# Patient Record
Sex: MALE | Race: OTHER | Employment: FULL TIME | URBAN - METROPOLITAN AREA
[De-identification: names, ages, dates, MRNs, and addresses within clinical notes are randomized per-mention and may not be internally consistent; named-entity substitution may affect disease eponyms.]

---

## 2017-07-31 ENCOUNTER — HOSPITAL ENCOUNTER (INPATIENT)
Age: 47
LOS: 2 days | Discharge: HOME | DRG: 310 | End: 2017-08-02
Attending: EMERGENCY MEDICINE | Admitting: INTERNAL MEDICINE
Payer: COMMERCIAL

## 2017-07-31 ENCOUNTER — APPOINTMENT (OUTPATIENT)
Dept: GENERAL RADIOLOGY | Age: 47
DRG: 310 | End: 2017-07-31
Attending: EMERGENCY MEDICINE
Payer: COMMERCIAL

## 2017-07-31 DIAGNOSIS — I48.92 ATRIAL FLUTTER WITH RAPID VENTRICULAR RESPONSE (HCC): Primary | ICD-10-CM

## 2017-07-31 PROBLEM — I48.91 ATRIAL FIBRILLATION (HCC): Status: ACTIVE | Noted: 2017-07-31

## 2017-07-31 LAB
ALBUMIN SERPL BCP-MCNC: 3.3 G/DL (ref 3.5–5)
ALBUMIN/GLOB SERPL: 1 {RATIO} (ref 1.1–2.2)
ALP SERPL-CCNC: 62 U/L (ref 45–117)
ALT SERPL-CCNC: 121 U/L (ref 12–78)
ANION GAP BLD CALC-SCNC: 9 MMOL/L (ref 5–15)
AST SERPL W P-5'-P-CCNC: 57 U/L (ref 15–37)
BASOPHILS # BLD AUTO: 0 K/UL (ref 0–0.1)
BASOPHILS # BLD: 1 % (ref 0–1)
BILIRUB SERPL-MCNC: 1 MG/DL (ref 0.2–1)
BUN SERPL-MCNC: 12 MG/DL (ref 6–20)
BUN/CREAT SERPL: 11 (ref 12–20)
CALCIUM SERPL-MCNC: 8.3 MG/DL (ref 8.5–10.1)
CHLORIDE SERPL-SCNC: 106 MMOL/L (ref 97–108)
CO2 SERPL-SCNC: 23 MMOL/L (ref 21–32)
CREAT SERPL-MCNC: 1.05 MG/DL (ref 0.7–1.3)
EOSINOPHIL # BLD: 0.2 K/UL (ref 0–0.4)
EOSINOPHIL NFR BLD: 3 % (ref 0–7)
ERYTHROCYTE [DISTWIDTH] IN BLOOD BY AUTOMATED COUNT: 15.1 % (ref 11.5–14.5)
GLOBULIN SER CALC-MCNC: 3.2 G/DL (ref 2–4)
GLUCOSE SERPL-MCNC: 102 MG/DL (ref 65–100)
HCT VFR BLD AUTO: 42.7 % (ref 36.6–50.3)
HGB BLD-MCNC: 14.6 G/DL (ref 12.1–17)
LYMPHOCYTES # BLD AUTO: 21 % (ref 12–49)
LYMPHOCYTES # BLD: 1.3 K/UL (ref 0.8–3.5)
MAGNESIUM SERPL-MCNC: 1.8 MG/DL (ref 1.6–2.4)
MCH RBC QN AUTO: 34.3 PG (ref 26–34)
MCHC RBC AUTO-ENTMCNC: 34.2 G/DL (ref 30–36.5)
MCV RBC AUTO: 100.2 FL (ref 80–99)
MONOCYTES # BLD: 0.7 K/UL (ref 0–1)
MONOCYTES NFR BLD AUTO: 12 % (ref 5–13)
NEUTS SEG # BLD: 3.9 K/UL (ref 1.8–8)
NEUTS SEG NFR BLD AUTO: 63 % (ref 32–75)
PLATELET # BLD AUTO: 218 K/UL (ref 150–400)
POTASSIUM SERPL-SCNC: 4 MMOL/L (ref 3.5–5.1)
PROT SERPL-MCNC: 6.5 G/DL (ref 6.4–8.2)
RBC # BLD AUTO: 4.26 M/UL (ref 4.1–5.7)
SODIUM SERPL-SCNC: 138 MMOL/L (ref 136–145)
TROPONIN I SERPL-MCNC: 0.09 NG/ML
WBC # BLD AUTO: 6.1 K/UL (ref 4.1–11.1)

## 2017-07-31 PROCEDURE — 93306 TTE W/DOPPLER COMPLETE: CPT

## 2017-07-31 PROCEDURE — 93005 ELECTROCARDIOGRAM TRACING: CPT

## 2017-07-31 PROCEDURE — 85025 COMPLETE CBC W/AUTO DIFF WBC: CPT | Performed by: EMERGENCY MEDICINE

## 2017-07-31 PROCEDURE — 71010 XR CHEST PORT: CPT

## 2017-07-31 PROCEDURE — 74011000258 HC RX REV CODE- 258: Performed by: EMERGENCY MEDICINE

## 2017-07-31 PROCEDURE — 84484 ASSAY OF TROPONIN QUANT: CPT | Performed by: EMERGENCY MEDICINE

## 2017-07-31 PROCEDURE — 74011250636 HC RX REV CODE- 250/636: Performed by: EMERGENCY MEDICINE

## 2017-07-31 PROCEDURE — 36415 COLL VENOUS BLD VENIPUNCTURE: CPT | Performed by: EMERGENCY MEDICINE

## 2017-07-31 PROCEDURE — 65660000000 HC RM CCU STEPDOWN

## 2017-07-31 PROCEDURE — 96365 THER/PROPH/DIAG IV INF INIT: CPT

## 2017-07-31 PROCEDURE — 80053 COMPREHEN METABOLIC PANEL: CPT | Performed by: EMERGENCY MEDICINE

## 2017-07-31 PROCEDURE — 83735 ASSAY OF MAGNESIUM: CPT | Performed by: EMERGENCY MEDICINE

## 2017-07-31 PROCEDURE — 99284 EMERGENCY DEPT VISIT MOD MDM: CPT

## 2017-07-31 PROCEDURE — 96376 TX/PRO/DX INJ SAME DRUG ADON: CPT

## 2017-07-31 PROCEDURE — 74011000250 HC RX REV CODE- 250: Performed by: EMERGENCY MEDICINE

## 2017-07-31 RX ORDER — DILTIAZEM HYDROCHLORIDE 5 MG/ML
10 INJECTION INTRAVENOUS
Status: COMPLETED | OUTPATIENT
Start: 2017-07-31 | End: 2017-07-31

## 2017-07-31 RX ORDER — WARFARIN 1 MG/1
3 TABLET ORAL
COMMUNITY
End: 2017-08-09 | Stop reason: DRUGHIGH

## 2017-07-31 RX ORDER — SODIUM CHLORIDE 0.9 % (FLUSH) 0.9 %
5-10 SYRINGE (ML) INJECTION AS NEEDED
Status: DISCONTINUED | OUTPATIENT
Start: 2017-07-31 | End: 2017-08-02 | Stop reason: HOSPADM

## 2017-07-31 RX ORDER — SODIUM CHLORIDE 0.9 % (FLUSH) 0.9 %
5-10 SYRINGE (ML) INJECTION EVERY 8 HOURS
Status: DISCONTINUED | OUTPATIENT
Start: 2017-07-31 | End: 2017-08-02 | Stop reason: HOSPADM

## 2017-07-31 RX ADMIN — DILTIAZEM HYDROCHLORIDE 10 MG/HR: 5 INJECTION INTRAVENOUS at 17:08

## 2017-07-31 RX ADMIN — DILTIAZEM HYDROCHLORIDE 10 MG: 5 INJECTION INTRAVENOUS at 17:04

## 2017-07-31 RX ADMIN — Medication 10 ML: at 21:57

## 2017-07-31 RX ADMIN — SODIUM CHLORIDE 1000 ML: 900 INJECTION, SOLUTION INTRAVENOUS at 17:03

## 2017-07-31 RX ADMIN — Medication 10 ML: at 17:08

## 2017-07-31 NOTE — ROUTINE PROCESS
TRANSFER - OUT REPORT:    Verbal report given to Radames Franks RN(name) on Mahad Thomas  being transferred to [facility](unit) for routine progression of care       Report consisted of patients Situation, Background, Assessment and   Recommendations(SBAR). Information from the following report(s) SBAR, ED Summary and MAR was reviewed with the receiving nurse. Lines:   Peripheral IV 07/31/17 Left Forearm (Active)   Site Assessment Clean, dry, & intact 7/31/2017  5:33 PM   Phlebitis Assessment 0 7/31/2017  5:33 PM   Infiltration Assessment 0 7/31/2017  5:33 PM   Dressing Status Clean, dry, & intact 7/31/2017  5:33 PM   Dressing Type Transparent 7/31/2017  5:33 PM   Hub Color/Line Status Pink 7/31/2017  5:33 PM        Opportunity for questions and clarification was provided.       Patient transported with:   Registered Nurse

## 2017-07-31 NOTE — IP AVS SNAPSHOT
[address] 
[phone] Patient: Dougie Carter MRN: [id number] FQN:[id number] You are allergic to the following No active allergies Recent Documentation Height Weight BMI Smoking Status 1.727 m 75.5 kg 25.31 kg/m2 Never Smoker Emergency Contacts Name Discharge Info Relation Home Work Mobile RenatoleloIsac DISCHARGE CAREGIVER [3] Friend [5]   [phone] About your hospitalization You were admitted on:  July 31, 2017 You last received care in the:  [facility] 3 PROG CARE TELE 2 You were discharged on:  August 2, 2017 Unit phone number:  [phone] Why you were hospitalized Your primary diagnosis was:  Not on File Your diagnoses also included:  Atrial Fibrillation (Hcc) Providers Seen During Your Hospitalizations Provider Role Specialty Primary office phone Anaid Robin MD Attending Provider Emergency Medicine [phone] Meghna Ontiveros MD Attending Provider Cardiology [phone] Your Primary Care Physician (PCP) Primary Care Physician Office Phone Office Fax NONE ** None ** ** None ** Follow-up Information Follow up With Details Comments Contact Info None   None ([phone]) Patient stated that they have no PCP Meghna Ontiveros MD On 8/9/2017 1:20 pm  [id number] [address] [facility] 600 [address] [facility] 
[phone] Your Appointments Wednesday August 09, 2017  1:20 PM EDT HOSPITAL DISCHARGE with Meghna Ontiveros MD  
[facility] ([address]) [address] [place] [address] [phone] [facility]  
[phone] Current Discharge Medication List  
  
CONTINUE these medications which have NOT CHANGED Dose & Instructions Dispensing Information Comments Morning Noon Evening Bedtime * CARVEDILOL PO Your last dose was: Your next dose is:    
   
   
 Dose:  10 mg Take 10 mg by mouth daily (after breakfast). Translated from medication list in [place] (medication name Artist) Refills:  0  
     
   
   
   
  
 * CARVEDILOL PO Your last dose was: Your next dose is:    
   
   
 Dose:  5 mg Take 5 mg by mouth daily (after dinner). Translated from medication list in [facility] (medication name Artist) Refills:  0  
     
   
   
   
  
 enalapril 2.5 mg tablet Commonly known as:  Willia Rosendo Your last dose was: Your next dose is:    
   
   
 Dose:  2.5 mg Take 2.5 mg by mouth daily. Refills:  0 FEBUXOSTAT PO Your last dose was: Your next dose is:    
   
   
 Dose:  50 mg Take 50 mg by mouth daily (after breakfast). Translated from medication list in [facility] (medication name Kimberley Khan) Refills:  0  
     
   
   
   
  
 * warfarin 1 mg tablet Commonly known as:  COUMADIN Your last dose was: Your next dose is:    
   
   
 Dose:  3 mg Take 3 mg by mouth daily (with breakfast). Total dose 3.75mg daily every morning per patient Refills:  0  
     
   
   
   
  
 * warfarin Tab Commonly known as:  COUMADIN Your last dose was: Your next dose is:    
   
   
 Dose:  0.75 mg Take 0.75 mg by mouth daily (with breakfast). Total dose 3.75 mg every morning per patient Refills:  0  
     
   
   
   
  
 * Notice: This list has 4 medication(s) that are the same as other medications prescribed for you. Read the directions carefully, and ask your doctor or other care provider to review them with you. Discharge Instructions Increase carvedilol to 10 mg twice a day Atrial Fibrillation: Care Instructions Your Care Instructions Atrial fibrillation is an irregular and often fast heartbeat. Treating this condition is important for several reasons.  It can cause blood clots, which can travel from your heart to your brain and cause a stroke. If you have a fast heartbeat, you may feel lightheaded, dizzy, and weak. An irregular heartbeat can also increase your risk for heart failure. Atrial fibrillation is often the result of another heart condition, such as high blood pressure or coronary artery disease. Making changes to improve your heart condition will help you stay healthy and active. Follow-up care is a key part of your treatment and safety. Be sure to make and go to all appointments, and call your doctor if you are having problems. It's also a good idea to know your test results and keep a list of the medicines you take. How can you care for yourself at home? Medicines · Take your medicines exactly as prescribed. Call your doctor if you think you are having a problem with your medicine. You will get more details on the specific medicines your doctor prescribes. · If your doctor has given you a blood thinner to prevent a stroke, be sure you get instructions about how to take your medicine safely. Blood thinners can cause serious bleeding problems. · Do not take any vitamins, over-the-counter drugs, or herbal products without talking to your doctor first. 
Lifestyle changes · Do not smoke. Smoking can increase your chance of a stroke and heart attack. If you need help quitting, talk to your doctor about stop-smoking programs and medicines. These can increase your chances of quitting for good. · Eat a heart-healthy diet. · Stay at a healthy weight. Lose weight if you need to. · Limit alcohol to 2 drinks a day for men and 1 drink a day for women. Too much alcohol can cause health problems. · Avoid colds and flu. Get a pneumococcal vaccine shot. If you have had one before, ask your doctor whether you need another dose. Get a flu shot every year. If you must be around people with colds or flu, wash your hands often. Activity · If your doctor recommends it, get more exercise. Walking is a good choice. Bit by bit, increase the amount you walk every day. Try for at least 30 minutes on most days of the week. You also may want to swim, bike, or do other activities. Your doctor may suggest that you join a cardiac rehabilitation program so that you can have help increasing your physical activity safely. · Start light exercise if your doctor says it is okay. Even a small amount will help you get stronger, have more energy, and manage stress. Walking is an easy way to get exercise. Start out by walking a little more than you did in the hospital. Gradually increase the amount you walk. · When you exercise, watch for signs that your heart is working too hard. You are pushing too hard if you cannot talk while you are exercising. If you become short of breath or dizzy or have chest pain, sit down and rest immediately. · Check your pulse regularly. Place two fingers on the artery at the palm side of your wrist, in line with your thumb. If your heartbeat seems uneven or fast, talk to your doctor. When should you call for help? Call 911 anytime you think you may need emergency care. For example, call if: 
· You have symptoms of a heart attack. These may include: ¨ Chest pain or pressure, or a strange feeling in the chest. 
¨ Sweating. ¨ Shortness of breath. ¨ Nausea or vomiting. ¨ Pain, pressure, or a strange feeling in the back, neck, jaw, or upper belly or in one or both shoulders or arms. ¨ Lightheadedness or sudden weakness. ¨ A fast or irregular heartbeat. After you call 911, the  may tell you to chew 1 adult-strength or 2 to 4 low-dose aspirin. Wait for an ambulance. Do not try to drive yourself. · You have symptoms of a stroke. These may include: 
¨ Sudden numbness, tingling, weakness, or loss of movement in your face, arm, or leg, especially on only one side of your body. ¨ Sudden vision changes. ¨ Sudden trouble speaking. ¨ Sudden confusion or trouble understanding simple statements. ¨ Sudden problems with walking or balance. ¨ A sudden, severe headache that is different from past headaches. · You passed out (lost consciousness). Call your doctor now or seek immediate medical care if: 
· You have new or increased shortness of breath. · You feel dizzy or lightheaded, or you feel like you may faint. · Your heart rate becomes irregular. · You can feel your heart flutter in your chest or skip heartbeats. Tell your doctor if these symptoms are new or worse. Watch closely for changes in your health, and be sure to contact your doctor if you have any problems. Where can you learn more? Go to http://remington-rose.info/. Enter U020 in the search box to learn more about \"Atrial Fibrillation: Care Instructions. \" Current as of: September 21, 2016 Content Version: 11.3 © 4554-2403 [employer]. Care instructions adapted under license by [employer] (which disclaims liability or warranty for this information). If you have questions about a medical condition or this instruction, always ask your healthcare professional. [place] [address] any warranty or liability for your use of this information. High INR Test Result: Admission INR 4.2 (7/31/17). Your Care Instructions You had a blood test to check how long it takes your blood to clot. This test is called a PT or prothrombin time test. The result of the test is called the INR level. A high INR level can happen when you take warfarin (Coumadin). Warfarin helps prevent blood clots. To do this, it slows the amount of time it takes for your blood to clot. This raises your INR level. The INR goal for people who take warfarin is usually from 2 to 3. A value higher than 3.5 increases the risk of bleeding problems. Many things can affect the way warfarin works.  Some natural health products and other medicines can make warfarin work too well. That can raise the risk of bleeding. If you drink a lot of alcohol, that may raise your INR. And severe diarrhea or vomiting can also raise your INR. The best way to lower your INR will depend on several things. In some cases, the doctor may have you stop taking warfarin for a few days. You may also be given other medicines to take. You will need to be tested often to make sure your INR level is going down. You will also need to watch for signs of bleeding. The doctor has checked you carefully, but problems can develop later. If you notice any problems or new symptoms, get medical treatment right away. Follow-up care is a key part of your treatment and safety. Be sure to make and go to all appointments, and call your doctor if you are having problems. It's also a good idea to know your test results and keep a list of the medicines you take. How can you care for yourself at home? Be careful with medicines and foods · Don't start or stop taking any medicines, vitamins, or natural remedies unless you first talk to your doctor. · Keep the amount of vitamin K in your diet about the same from day to day. Do not suddenly eat a lot more or a lot less food that is rich in vitamin K than you usually do. Vitamin K affects how warfarin works and how your blood clots. · Limit your use of alcohol. Avoid bleeding by preventing falls · Wear slippers or shoes with nonskid soles. · Remove throw rugs and clutter. · Rearrange furniture and electrical cords to keep them out of walking paths. · Keep stairways, porches, and outside walkways well lit. Use night-lights in hallways and bathrooms. · Be extra careful when you work with sharp tools or knives. When should you call for help? Call 911 anytime you think you may need emergency care. For example, call if: 
· You have a sudden, severe headache that is different from past headaches. Call your doctor now or seek immediate medical care if: 
· You have any abnormal bleeding, such as: 
¨ Nosebleeds. ¨ Vaginal bleeding that is different (heavier, more frequent, at a different time of the month) than what you are used to. ¨ Bloody or black stools, or rectal bleeding. ¨ Bloody or pink urine. Watch closely for changes in your health, and be sure to contact your doctor if you have any problems. Where can you learn more? Go to http://remington-rose.info/. Enter C178 in the search box to learn more about \"High INR Test Result: Care Instructions. \" Current as of: October 11, 2016 Content Version: 11.3 © 8583-1826 [employer]. Care instructions adapted under license by [employer] (which disclaims liability or warranty for this information). If you have questions about a medical condition or this instruction, always ask your healthcare professional. [place] [address] any warranty or liability for your use of this information. Avoiding Triggers With Heart Failure: Care Instructions Your Care Instructions Triggers are anything that make your heart failure flare up. A flare-up is also called \"sudden heart failure\" or \"acute heart failure. \" When you have a flare-up, fluid builds up in your lungs, and you have problems breathing. You might need to go to the hospital. By watching for changes in your condition and avoiding triggers, you can prevent heart failure flare-ups. Follow-up care is a key part of your treatment and safety. Be sure to make and go to all appointments, and call your doctor if you are having problems. It's also a good idea to know your test results and keep a list of the medicines you take. How can you care for yourself at home? Watch for changes in your weight and condition · Weigh yourself without clothing at the same time each day. Record your weight.  Call your doctor if you gain 3 pounds or more in 24 hrs or 5 pounds in one week. A sudden weight gain may mean that your heart failure is getting worse. · Keep a daily record of your symptoms. Write down any changes in how you feel, such as new shortness of breath, cough, or problems eating. Also record if your ankles are more swollen than usual and if you have to urinate in the night more often. Note anything that you ate or did that could have triggered these changes. Limit sodium Sodium causes your body to hold on to water, making it harder for your heart to pump. People get most of their sodium from processed foods. Fast food and restaurant meals also tend to be very high in sodium. · Your doctor may suggest that you limit sodium to 1,500 milligrams (mg) a day. That is less than 1 teaspoon of salt a day, including all the salt you eat in cooking or in packaged foods. · Read food labels on cans and food packages. They tell you how much sodium you get in one serving. Check the serving size. If you eat more than one serving, you are getting more sodium. · Be aware that sodium can come in forms other than salt, including monosodium glutamate (MSG), sodium citrate, and sodium bicarbonate (baking soda). MSG is often added to Asian food. You can sometimes ask for food without MSG or salt. · Slowly reducing salt will help you adjust to the taste. Take the salt shaker off the table. · Flavor your food with garlic, lemon juice, onion, vinegar, herbs, and spices instead of salt. Do not use soy sauce, steak sauce, onion salt, garlic salt, mustard, or ketchup on your food, unless it is labeled \"low-sodium\" or \"low-salt. \" 
· Make your own salad dressings, sauces, and ketchup without adding salt. · Use fresh or frozen ingredients, instead of canned ones, whenever you can. Choose low-sodium canned goods. · Eat less processed food and food from restaurants, including fast food. Exercise as directed Moderate, regular exercise is very good for your heart.  It improves your blood flow and helps control your weight. But too much exercise can stress your heart and cause a heart failure flare-up. · Check with your doctor before you start an exercise program. 
· Walking is an easy way to get exercise. Start out slowly. Gradually increase the length and pace of your walk. Swimming, riding a bike, and using a treadmill are also good forms of exercise. · When you exercise, watch for signs that your heart is working too hard. You are pushing yourself too hard if you cannot talk while you are exercising. If you become short of breath or dizzy or have chest pain, stop, sit down, and rest. 
· Do not exercise when you do not feel well. Take medicines correctly · Take your medicines exactly as prescribed. Call your doctor if you think you are having a problem with your medicine. · Make a list of all the medicines you take. Include those prescribed to you by other doctors and any over-the-counter medicines, vitamins, or supplements you take. Take this list with you when you go to any doctor. · Take your medicines at the same time every day. It may help you to post a list of all the medicines you take every day and what time of day you take them. · Make taking your medicine as simple as you can. Plan times to take your medicines when you are doing other things, such as eating a meal or getting ready for bed. This will make it easier to remember to take your medicines. · Get organized. Use helpful tools, such as daily or weekly pill containers. When should you call for help? Call 911 if you have symptoms of sudden heart failure such as: 
· You have severe trouble breathing. · You cough up pink, foamy mucus. · You have a new irregular or rapid heartbeat. Call your doctor now or seek immediate medical care if: 
· You have new or increased shortness of breath. · You are dizzy or lightheaded, or you feel like you may faint. · You have sudden weight gain, such as 3 pounds in 24 hours, or 5 pounds in one week. · You have increased swelling in your legs, ankles, or feet. · You are suddenly so tired or weak that you cannot do your usual activities. Watch closely for changes in your health, and be sure to contact your doctor if you develop new symptoms. Where can you learn more? Go to http://remington-rose.info/ Enter N708 in the search box to learn more about \"Avoiding Triggers With Heart Failure: Care Instructions. \" 
© 3834-5727 Healthwise, [employer]. Care instructions adapted under license by [employer] (which disclaims liability or warranty for this information). This care instruction is for use with your licensed healthcare professional. If you have questions about a medical condition or this instruction, always ask your healthcare professional. [place] [address] any warranty or liability for your use of this information. Content Version: 74.0.925832; Current as of: January 27, 2016 (modified 10/10/16). Discharge Orders None [employer] Announcement We are excited to announce that we are making your provider's discharge notes available to you in [employer]. You will see these notes when they are completed and signed by the physician that discharged you from your recent hospital stay. If you have any questions or concerns about any information you see in [employer], please call the Health Information Department where you were seen or reach out to your Primary Care Provider for more information about your plan of care. Introducing [facility]! Dear Bebe Half: 
Thank you for requesting a [employer] account. Our records indicate that you already have an active [employer] account. You can access your account anytime at [email or web address] Did you know that you can access your hospital and ER discharge instructions at any time in MyChart? You can also review all of your test results from your hospital stay or ER visit. Additional Information If you have questions, please visit the Frequently Asked Questions section of the [employer] website at [email or web address] Remember, MyChart is NOT to be used for urgent needs. For medical emergencies, dial 911. Now available from your iPhone and Android! General Information Please provide this summary of care documentation to your next provider. Patient Signature:  ____________________________________________________________ Date:  ____________________________________________________________  
  
Tiffanie [place] Provider Signature:  ____________________________________________________________ Date:  ____________________________________________________________

## 2017-07-31 NOTE — IP AVS SNAPSHOT
Summary of Care Report The Summary of Care report has been created to help improve care coordination. Users with access to [employer] or [address] (Web-based application) may access additional patient information including the Discharge Summary. If you are not currently a [address] user and need more information, please call the number listed below in the [place] [address] section and ask to be connected with Medical Records. Facility Information Name Address Phone [address] [place] [phone] Patient Information Patient Name Sex  Arian Samano ([id number]) Male 1970 Discharge Information Admitting Provider Service Area Unit Stephany Odell MD / Joce [address] [place] [address] [address] [facility]  [phone] Discharge Provider Discharge Date/Time Discharge Disposition Destination (none) 2017 (Pending) AHR (none) Patient Language Language Sudanese [26] Hospital Problems as of 2017  Never Reviewed Class Noted - Resolved Last Modified POA Active Problems Atrial fibrillation ([facility] [address]  2017 - Present 2017 by Stephany Odell MD Unknown Entered by Stephany Odell MD  
  
You are allergic to the following No active allergies Current Discharge Medication List  
  
CONTINUE these medications which have NOT CHANGED Dose & Instructions Dispensing Information Comments * CARVEDILOL PO Dose:  10 mg Take 10 mg by mouth daily (after breakfast). Translated from medication list in [place] (medication name Artist) Refills:  0  
   
 * CARVEDILOL PO Dose:  5 mg Take 5 mg by mouth daily (after dinner). Translated from medication list in [place] (medication name Artist) Refills:  0  
   
 enalapril 2.5 mg tablet Commonly known as:  Larina Dense  
 Dose:  2.5 mg Take 2.5 mg by mouth daily. Refills:  0 FEBUXOSTAT PO Dose:  50 mg Take 50 mg by mouth daily (after breakfast). Translated from medication list in [place] (medication name Phuong Koehler) Refills:  0  
   
 * warfarin 1 mg tablet Commonly known as:  COUMADIN Dose:  3 mg Take 3 mg by mouth daily (with breakfast). Total dose 3.75mg daily every morning per patient Refills:  0  
   
 * warfarin Tab Commonly known as:  COUMADIN Dose:  0.75 mg Take 0.75 mg by mouth daily (with breakfast). Total dose 3.75 mg every morning per patient Refills:  0  
   
 * Notice: This list has 4 medication(s) that are the same as other medications prescribed for you. Read the directions carefully, and ask your doctor or other care provider to review them with you. Follow-up Information Follow up With Details Comments Contact Info None   None (395) Patient stated that they have no PCP Dereje Casas MD On 8/9/2017 1:20 pm  [address] [address] 
[phone] Discharge Instructions Increase carvedilol to 10 mg twice a day Atrial Fibrillation: Care Instructions Your Care Instructions Atrial fibrillation is an irregular and often fast heartbeat. Treating this condition is important for several reasons. It can cause blood clots, which can travel from your heart to your brain and cause a stroke. If you have a fast heartbeat, you may feel lightheaded, dizzy, and weak. An irregular heartbeat can also increase your risk for heart failure. Atrial fibrillation is often the result of another heart condition, such as high blood pressure or coronary artery disease. Making changes to improve your heart condition will help you stay healthy and active. Follow-up care is a key part of your treatment and safety.  Be sure to make and go to all appointments, and call your doctor if you are having problems. It's also a good idea to know your test results and keep a list of the medicines you take. How can you care for yourself at home? Medicines · Take your medicines exactly as prescribed. Call your doctor if you think you are having a problem with your medicine. You will get more details on the specific medicines your doctor prescribes. · If your doctor has given you a blood thinner to prevent a stroke, be sure you get instructions about how to take your medicine safely. Blood thinners can cause serious bleeding problems. · Do not take any vitamins, over-the-counter drugs, or herbal products without talking to your doctor first. 
Lifestyle changes · Do not smoke. Smoking can increase your chance of a stroke and heart attack. If you need help quitting, talk to your doctor about stop-smoking programs and medicines. These can increase your chances of quitting for good. · Eat a heart-healthy diet. · Stay at a healthy weight. Lose weight if you need to. · Limit alcohol to 2 drinks a day for men and 1 drink a day for women. Too much alcohol can cause health problems. · Avoid colds and flu. Get a pneumococcal vaccine shot. If you have had one before, ask your doctor whether you need another dose. Get a flu shot every year. If you must be around people with colds or flu, wash your hands often. Activity · If your doctor recommends it, get more exercise. Walking is a good choice. Bit by bit, increase the amount you walk every day. Try for at least 30 minutes on most days of the week. You also may want to swim, bike, or do other activities. Your doctor may suggest that you join a cardiac rehabilitation program so that you can have help increasing your physical activity safely. · Start light exercise if your doctor says it is okay. Even a small amount will help you get stronger, have more energy, and manage stress. Walking is an easy way to get exercise.  Start out by walking a little more than you did in the hospital. Gradually increase the amount you walk. · When you exercise, watch for signs that your heart is working too hard. You are pushing too hard if you cannot talk while you are exercising. If you become short of breath or dizzy or have chest pain, sit down and rest immediately. · Check your pulse regularly. Place two fingers on the artery at the palm side of your wrist, in line with your thumb. If your heartbeat seems uneven or fast, talk to your doctor. When should you call for help? Call 911 anytime you think you may need emergency care. For example, call if: 
· You have symptoms of a heart attack. These may include: ¨ Chest pain or pressure, or a strange feeling in the chest. 
¨ Sweating. ¨ Shortness of breath. ¨ Nausea or vomiting. ¨ Pain, pressure, or a strange feeling in the back, neck, jaw, or upper belly or in one or both shoulders or arms. ¨ Lightheadedness or sudden weakness. ¨ A fast or irregular heartbeat. After you call 911, the  may tell you to chew 1 adult-strength or 2 to 4 low-dose aspirin. Wait for an ambulance. Do not try to drive yourself. · You have symptoms of a stroke. These may include: 
¨ Sudden numbness, tingling, weakness, or loss of movement in your face, arm, or leg, especially on only one side of your body. ¨ Sudden vision changes. ¨ Sudden trouble speaking. ¨ Sudden confusion or trouble understanding simple statements. ¨ Sudden problems with walking or balance. ¨ A sudden, severe headache that is different from past headaches. · You passed out (lost consciousness). Call your doctor now or seek immediate medical care if: 
· You have new or increased shortness of breath. · You feel dizzy or lightheaded, or you feel like you may faint. · Your heart rate becomes irregular. · You can feel your heart flutter in your chest or skip heartbeats. Tell your doctor if these symptoms are new or worse. Watch closely for changes in your health, and be sure to contact your doctor if you have any problems. Where can you learn more? Go to http://remington-rose.info/. Enter U020 in the search box to learn more about \"Atrial Fibrillation: Care Instructions. \" Current as of: September 21, 2016 Content Version: 11.3 © 2475-0702 [employer]. Care instructions adapted under license by [employer] (which disclaims liability or warranty for this information). If you have questions about a medical condition or this instruction, always ask your healthcare professional. [place] [address] any warranty or liability for your use of this information. High INR Test Result: Admission INR 4.2 (7/31/17). Your Care Instructions You had a blood test to check how long it takes your blood to clot. This test is called a PT or prothrombin time test. The result of the test is called the INR level. A high INR level can happen when you take warfarin (Coumadin). Warfarin helps prevent blood clots. To do this, it slows the amount of time it takes for your blood to clot. This raises your INR level. The INR goal for people who take warfarin is usually from 2 to 3. A value higher than 3.5 increases the risk of bleeding problems. Many things can affect the way warfarin works. Some natural health products and other medicines can make warfarin work too well. That can raise the risk of bleeding. If you drink a lot of alcohol, that may raise your INR. And severe diarrhea or vomiting can also raise your INR. The best way to lower your INR will depend on several things. In some cases, the doctor may have you stop taking warfarin for a few days. You may also be given other medicines to take. You will need to be tested often to make sure your INR level is going down. You will also need to watch for signs of bleeding. The doctor has checked you carefully, but problems can develop later. If you notice any problems or new symptoms, get medical treatment right away. Follow-up care is a key part of your treatment and safety. Be sure to make and go to all appointments, and call your doctor if you are having problems. It's also a good idea to know your test results and keep a list of the medicines you take. How can you care for yourself at home? Be careful with medicines and foods · Don't start or stop taking any medicines, vitamins, or natural remedies unless you first talk to your doctor. · Keep the amount of vitamin K in your diet about the same from day to day. Do not suddenly eat a lot more or a lot less food that is rich in vitamin K than you usually do. Vitamin K affects how warfarin works and how your blood clots. · Limit your use of alcohol. Avoid bleeding by preventing falls · Wear slippers or shoes with nonskid soles. · Remove throw rugs and clutter. · Rearrange furniture and electrical cords to keep them out of walking paths. · Keep stairways, porches, and outside walkways well lit. Use night-lights in hallways and bathrooms. · Be extra careful when you work with sharp tools or knives. When should you call for help? Call 911 anytime you think you may need emergency care. For example, call if: 
· You have a sudden, severe headache that is different from past headaches. Call your doctor now or seek immediate medical care if: 
· You have any abnormal bleeding, such as: 
¨ Nosebleeds. ¨ Vaginal bleeding that is different (heavier, more frequent, at a different time of the month) than what you are used to. ¨ Bloody or black stools, or rectal bleeding. ¨ Bloody or pink urine. Watch closely for changes in your health, and be sure to contact your doctor if you have any problems. Where can you learn more? Go to http://remington-rose.info/. Enter C178 in the search box to learn more about \"High INR Test Result: Care Instructions. \" Current as of: October 11, 2016 Content Version: 11.3 © 2859-5023 [employer]. Care instructions adapted under license by [employer] (which disclaims liability or warranty for this information). If you have questions about a medical condition or this instruction, always ask your healthcare professional. [address] any warranty or liability for your use of this information. Avoiding Triggers With Heart Failure: Care Instructions Your Care Instructions Triggers are anything that make your heart failure flare up. A flare-up is also called \"sudden heart failure\" or \"acute heart failure. \" When you have a flare-up, fluid builds up in your lungs, and you have problems breathing. You might need to go to the hospital. By watching for changes in your condition and avoiding triggers, you can prevent heart failure flare-ups. Follow-up care is a key part of your treatment and safety. Be sure to make and go to all appointments, and call your doctor if you are having problems. It's also a good idea to know your test results and keep a list of the medicines you take. How can you care for yourself at home? Watch for changes in your weight and condition · Weigh yourself without clothing at the same time each day. Record your weight. Call your doctor if you gain 3 pounds or more in 24 hrs or 5 pounds in one week. A sudden weight gain may mean that your heart failure is getting worse. · Keep a daily record of your symptoms. Write down any changes in how you feel, such as new shortness of breath, cough, or problems eating. Also record if your ankles are more swollen than usual and if you have to urinate in the night more often. Note anything that you ate or did that could have triggered these changes. Limit sodium Sodium causes your body to hold on to water, making it harder for your heart to pump. People get most of their sodium from processed foods. Fast food and restaurant meals also tend to be very high in sodium. · Your doctor may suggest that you limit sodium to 1,500 milligrams (mg) a day. That is less than 1 teaspoon of salt a day, including all the salt you eat in cooking or in packaged foods. · Read food labels on cans and food packages. They tell you how much sodium you get in one serving. Check the serving size. If you eat more than one serving, you are getting more sodium. · Be aware that sodium can come in forms other than salt, including monosodium glutamate (MSG), sodium citrate, and sodium bicarbonate (baking soda). MSG is often added to Asian food. You can sometimes ask for food without MSG or salt. · Slowly reducing salt will help you adjust to the taste. Take the salt shaker off the table. · Flavor your food with garlic, lemon juice, onion, vinegar, herbs, and spices instead of salt. Do not use soy sauce, steak sauce, onion salt, garlic salt, mustard, or ketchup on your food, unless it is labeled \"low-sodium\" or \"low-salt. \" 
· Make your own salad dressings, sauces, and ketchup without adding salt. · Use fresh or frozen ingredients, instead of canned ones, whenever you can. Choose low-sodium canned goods. · Eat less processed food and food from restaurants, including fast food. Exercise as directed Moderate, regular exercise is very good for your heart. It improves your blood flow and helps control your weight. But too much exercise can stress your heart and cause a heart failure flare-up. · Check with your doctor before you start an exercise program. 
· Walking is an easy way to get exercise. Start out slowly. Gradually increase the length and pace of your walk. Swimming, riding a bike, and using a treadmill are also good forms of exercise. · When you exercise, watch for signs that your heart is working too hard. You are pushing yourself too hard if you cannot talk while you are exercising. If you become short of breath or dizzy or have chest pain, stop, sit down, and rest. 
· Do not exercise when you do not feel well. Take medicines correctly · Take your medicines exactly as prescribed. Call your doctor if you think you are having a problem with your medicine. · Make a list of all the medicines you take. Include those prescribed to you by other doctors and any over-the-counter medicines, vitamins, or supplements you take. Take this list with you when you go to any doctor. · Take your medicines at the same time every day. It may help you to post a list of all the medicines you take every day and what time of day you take them. · Make taking your medicine as simple as you can. Plan times to take your medicines when you are doing other things, such as eating a meal or getting ready for bed. This will make it easier to remember to take your medicines. · Get organized. Use helpful tools, such as daily or weekly pill containers. When should you call for help? Call 911 if you have symptoms of sudden heart failure such as: 
· You have severe trouble breathing. · You cough up pink, foamy mucus. · You have a new irregular or rapid heartbeat. Call your doctor now or seek immediate medical care if: 
· You have new or increased shortness of breath. · You are dizzy or lightheaded, or you feel like you may faint. · You have sudden weight gain, such as 3 pounds in 24 hours, or 5 pounds in one week. · You have increased swelling in your legs, ankles, or feet. · You are suddenly so tired or weak that you cannot do your usual activities. Watch closely for changes in your health, and be sure to contact your doctor if you develop new symptoms. Where can you learn more? Go to http://remington-rose.info/ Enter W172 in the search box to learn more about \"Avoiding Triggers With Heart Failure: Care Instructions. \" 
© 4813-3189 [employer]wise, [employer]. Care instructions adapted under license by [employer] (which disclaims liability or warranty for this information). This care instruction is for use with your licensed healthcare professional. If you have questions about a medical condition or this instruction, always ask your healthcare professional. [place] [address] any warranty or liability for your use of this information. Content Version: 39.4.601158; Current as of: January 27, 2016 (modified 10/10/16). Chart Review Routing History No Routing History on File

## 2017-07-31 NOTE — IP AVS SNAPSHOT
[address] 
[phone] Patient: Haresh More MRN: [id number] ZJP:[birth year] Current Discharge Medication List  
  
CONTINUE these medications which have NOT CHANGED Dose & Instructions Dispensing Information Comments Morning Noon Evening Bedtime * CARVEDILOL PO Your last dose was: Your next dose is:    
   
   
 Dose:  10 mg Take 10 mg by mouth daily (after breakfast). Translated from medication list in [place] (medication name Artist) Refills:  0  
     
   
   
   
  
 * CARVEDILOL PO Your last dose was: Your next dose is:    
   
   
 Dose:  5 mg Take 5 mg by mouth daily (after dinner). Translated from medication list in [facility] (medication name Artist) Refills:  0  
     
   
   
   
  
 enalapril 2.5 mg tablet Commonly known as:  Frutoso Meckel Your last dose was: Your next dose is:    
   
   
 Dose:  2.5 mg Take 2.5 mg by mouth daily. Refills:  0 FEBUXOSTAT PO Your last dose was: Your next dose is:    
   
   
 Dose:  50 mg Take 50 mg by mouth daily (after breakfast). Translated from medication list in [place] (medication name Vandana Alejandro) Refills:  0  
     
   
   
   
  
 * warfarin 1 mg tablet Commonly known as:  COUMADIN Your last dose was: Your next dose is:    
   
   
 Dose:  3 mg Take 3 mg by mouth daily (with breakfast). Total dose 3.75mg daily every morning per patient Refills:  0  
     
   
   
   
  
 * warfarin Tab Commonly known as:  COUMADIN Your last dose was: Your next dose is:    
   
   
 Dose:  0.75 mg Take 0.75 mg by mouth daily (with breakfast). Total dose 3.75 mg every morning per patient Refills:  0  
     
   
   
   
  
 * Notice:   This list has 4 medication(s) that are the same as other medications prescribed for you. Read the directions carefully, and ask your doctor or other care provider to review them with you.

## 2017-07-31 NOTE — ED PROVIDER NOTES
HPI Comments: 48 y.o. male with past medical history significant for A-fib and HTN who presents from home with chief complaint of elevated HR. Pt reports he went home from work d/t elevated HR yesterday which has continued through today accompanied by mild SOB and diaphoresis this morning. He states his BP was \"high\" this morning. Pt notes he had similar episodes of elevated HR 3 days ago. Per Pt's coworker, Pt went to [facility] where he was recommended ED evaluation. Pt's coworker states Pt takes [place] medications daily which he believes are an antihypertensive, possibly an anticoagulant, and possibly a diuretic. He took all his regular medications this morning. Pt denies chest pain, nausea, vomiting, and dizziness. There are no other acute medical concerns at this time. Note written by Andres Reynolds, as dictated by Cuca Jin MD 4:33 PM    The history is provided by the patient (coworker). A  was used. No past medical history on file. No past surgical history on file. No family history on file. Social History     Social History    Marital status: N/A     Spouse name: N/A    Number of children: N/A    Years of education: N/A     Occupational History    Not on file. Social History Main Topics    Smoking status: Not on file    Smokeless tobacco: Not on file    Alcohol use Not on file    Drug use: Not on file    Sexual activity: Not on file     Other Topics Concern    Not on file     Social History Narrative         ALLERGIES: Review of patient's allergies indicates no known allergies. Review of Systems   Constitutional: Positive for diaphoresis. Respiratory: Positive for shortness of breath. Cardiovascular: Positive for palpitations. Negative for chest pain. Gastrointestinal: Negative for nausea and vomiting. Neurological: Negative for dizziness. All other systems reviewed and are negative.       Vitals:    07/31/17 1620 Pulse: (!) 134   Resp: 15   Temp: 98 °F (36.7 °C)   SpO2: 97%   Weight: 81.6 kg (180 lb)   Height: 5' 8\" (1.727 m)            Physical Exam   Constitutional: He is oriented to person, place, and time. He appears well-developed and well-nourished. No distress. HENT:   Head: Normocephalic and atraumatic. Eyes: Conjunctivae are normal. No scleral icterus. Neck: Neck supple. No tracheal deviation present. Cardiovascular: Normal heart sounds and intact distal pulses. An irregular rhythm present. Tachycardia present. Exam reveals no gallop and no friction rub. No murmur heard. Pulmonary/Chest: Effort normal and breath sounds normal. He has no wheezes. He has no rales. Abdominal: Soft. He exhibits no distension. There is no tenderness. There is no rebound and no guarding. Musculoskeletal: He exhibits no edema. Neurological: He is alert and oriented to person, place, and time. Skin: Skin is warm and dry. No rash noted. Psychiatric: He has a normal mood and affect. Nursing note and vitals reviewed. Note written by Andres Corrales, as dictated by Anaid Robin MD 4:33 PM    [facility]  ED Course       Procedures    PROGRESS NOTE:  4:35 PM  Results from [facility]: Negative UA, unremarkable BMP and CBC. ED EKG interpretation:  Rhythm: atrial flutter with variable AV block; Rate (approx.): 135; ST/T wave: non-specific changes; Note written by Andres Corrales, as dictated by Anaid Robin MD 4:33 PM    CONSULT NOTE:  5:12 Kyara Del Castillo MD spoke with Dr. Ariel Mccormick, Consult for Cardiology. Discussed available diagnostic tests and clinical findings. He is in agreement with care plans as outlined. Dr. Ariel Mccormick will evaluate Pt. Total critical care time spend exclusive of procedures:  32 min.   Anaid Robin MD  6:23 PM    A/P: A-flutter with RVR - on Cardizem drip; language barrier; possibly already on anticoagulant and rate-control agent but unclear as med's are written in [place]; admit for further management.   Jacy Cuenca MD  6:24 PM

## 2017-07-31 NOTE — H&P
HISTORY OF PRESENTING ILLNESS      Aram Feeler Cecil Cheadle is a 48 y.o. male with AF, HTN presenting with 3 days of epigastric discomfort, palpitations and SOB. He first noticed his BP was elevated into the 665'U systolic. He takes medications for AF however his paperwork/medications are all written in [facility]. He lives locally however travels back to [facility] every 4 months where his physicians are. He denies syncope. He was diagnosed with AF 16 years ago. He underwent stress testing 14 years ago. He does not see a local physician. He has been started on a cardizem gtt. HR's remain in the 120's. Lab data unrevealing thus far. CXR pending. ACTIVE PROBLEM LIST     There are no active problems to display for this patient. PAST MEDICAL HISTORY     No past medical history on file. PAST SURGICAL HISTORY     No past surgical history on file. ALLERGIES     No Known Allergies       FAMILY HISTORY     No family history on file. negative for cardiac disease       SOCIAL HISTORY     Social History     Social History    Marital status: N/A     Spouse name: N/A    Number of children: N/A    Years of education: N/A     Social History Main Topics    Smoking status: Not on file    Smokeless tobacco: Not on file    Alcohol use Not on file    Drug use: Not on file    Sexual activity: Not on file     Other Topics Concern    Not on file     Social History Narrative         MEDICATIONS     Current Facility-Administered Medications   Medication Dose Route Frequency    sodium chloride (NS) flush 5-10 mL  5-10 mL IntraVENous Q8H    sodium chloride (NS) flush 5-10 mL  5-10 mL IntraVENous PRN    dilTIAZem (CARDIZEM) 125 mg in dextrose 5% 125 mL infusion  0-15 mg/hr IntraVENous TITRATE     No current outpatient prescriptions on file. I have reviewed the nurses notes, vitals, problem list, allergy list, medical history, family, social history and medications.        REVIEW OF SYMPTOMS      General: Pt denies excessive weight gain or loss. Pt is able to conduct ADL's  HEENT: Denies blurred vision, headaches, hearing loss, epistaxis and difficulty swallowing. Respiratory: Denies cough, congestion, shortness of breath, PEREA, wheezing or stridor. Cardiovascular: Denies precordial pain, palpitations, edema or PND  Gastrointestinal: Denies poor appetite, indigestion, abdominal pain or blood in stool  Genitourinary: Denies hematuria, dysuria, increased urinary frequency  Musculoskeletal: Denies joint pain or swelling from muscles or joints  Neurologic: Denies tremor, paresthesias, headache, or sensory motor disturbance  Psychiatric: Denies confusion, insomnia, depression  Integumentray: Denies rash, itching or ulcers. Hematologic: Denies easy bruising, bleeding       PHYSICAL EXAMINATION      Vitals:    07/31/17 1620 07/31/17 1648 07/31/17 1700   BP:  (!) 140/96 (!) 133/115   Pulse: (!) 134  (!) 135   Resp: 15  18   Temp: 98 °F (36.7 °C)     SpO2: 97%  96%   Weight: 180 lb (81.6 kg)     Height: 5' 8\" (1.727 m)       General: Well developed, in no acute distress. HEENT: No jaundice, oral mucosa moist, no oral ulcers  Neck: Supple, no stiffness, no lymphadenopathy, supple  Heart:  Irregularly irreg, no murmur, gallop or rub, no jugular venous distention  Respiratory: Clear bilaterally x 4, no wheezing or rales  Abdomen:   Soft, non-tender, bowel sounds are active.   Extremities:  Trace bilateral lower extremity edema, normal cap refill, no cyanosis. Musculoskeletal: No clubbing, no deformities  Neuro: A&Ox3, speech clear, gait stable, cooperative, no focal neurologic deficits  Skin: Skin color is normal. No rashes or lesions.  Non diaphoretic, moist.  Vascular: 2+ pulses symmetric in all extremities       DIAGNOSTIC DATA      EKG: AF with RVR       LABORATORY DATA      Lab Results   Component Value Date/Time    WBC 6.1 07/31/2017 04:54 PM    HGB 14.6 07/31/2017 04:54 PM    HCT 42.7 07/31/2017 04:54 PM    PLATELET 328 07/31/2017 04:54 PM    .2 07/31/2017 04:54 PM      Lab Results   Component Value Date/Time    Sodium 138 07/31/2017 04:54 PM    Potassium 4.0 07/31/2017 04:54 PM    Chloride 106 07/31/2017 04:54 PM    CO2 23 07/31/2017 04:54 PM    Anion gap 9 07/31/2017 04:54 PM    Glucose 102 07/31/2017 04:54 PM    BUN 12 07/31/2017 04:54 PM    Creatinine 1.05 07/31/2017 04:54 PM    BUN/Creatinine ratio 11 07/31/2017 04:54 PM    GFR est AA >60 07/31/2017 04:54 PM    GFR est non-AA >60 07/31/2017 04:54 PM    Calcium 8.3 07/31/2017 04:54 PM    Bilirubin, total 1.0 07/31/2017 04:54 PM    AST (SGOT) 57 07/31/2017 04:54 PM    Alk. phosphatase 62 07/31/2017 04:54 PM    Protein, total 6.5 07/31/2017 04:54 PM    Albumin 3.3 07/31/2017 04:54 PM    Globulin 3.2 07/31/2017 04:54 PM    A-G Ratio 1.0 07/31/2017 04:54 PM    ALT (SGPT) 121 07/31/2017 04:54 PM           ASSESSMENT      1. Atrial fibrillation   A. Symptomatic   B. CHADSVASC 1  2. Hypertension  3. Chest pain       PLAN     Continue cardizem gtt and uptitrate dosing to target HR < 110 bpm. If remains in AF tomorrow, will consider CORINNA/DCCV. Will also need ischemic evaluation (CTA?). Will arrange for  tomorrow to translate his paperwork to determine what medications he takes. Will start eliquis 5 mg bid for now in preparation for possible DCCV. Obtain echocardiogram.       Thank you, Dr. Puma Pedro for allowing me to participate in the care of this extraordinarily pleasant male. Please do not hesitate to contact me for further questions/concerns.          Lexi Fallon MD  Cardiac Electrophysiology / Cardiology    [address] [facility] [address]  [address] [facility], [facility], [address]  [phone] / [phone] Fax   [phone] / [phone] Fax

## 2017-08-01 LAB
ANION GAP BLD CALC-SCNC: 9 MMOL/L (ref 5–15)
ATRIAL RATE: 394 BPM
BUN SERPL-MCNC: 14 MG/DL (ref 6–20)
BUN/CREAT SERPL: 13 (ref 12–20)
CALCIUM SERPL-MCNC: 8 MG/DL (ref 8.5–10.1)
CALCULATED R AXIS, ECG10: 132 DEGREES
CALCULATED T AXIS, ECG11: 45 DEGREES
CHLORIDE SERPL-SCNC: 108 MMOL/L (ref 97–108)
CO2 SERPL-SCNC: 23 MMOL/L (ref 21–32)
CREAT SERPL-MCNC: 1.1 MG/DL (ref 0.7–1.3)
DIAGNOSIS, 93000: NORMAL
ERYTHROCYTE [DISTWIDTH] IN BLOOD BY AUTOMATED COUNT: 15 % (ref 11.5–14.5)
GLUCOSE SERPL-MCNC: 89 MG/DL (ref 65–100)
HCT VFR BLD AUTO: 39.8 % (ref 36.6–50.3)
HGB BLD-MCNC: 13.4 G/DL (ref 12.1–17)
INR PPP: 4.2 (ref 0.9–1.1)
MAGNESIUM SERPL-MCNC: 1.7 MG/DL (ref 1.6–2.4)
MCH RBC QN AUTO: 33.4 PG (ref 26–34)
MCHC RBC AUTO-ENTMCNC: 33.7 G/DL (ref 30–36.5)
MCV RBC AUTO: 99.3 FL (ref 80–99)
PLATELET # BLD AUTO: 203 K/UL (ref 150–400)
POTASSIUM SERPL-SCNC: 3.8 MMOL/L (ref 3.5–5.1)
PROTHROMBIN TIME: 44.8 SEC (ref 9–11.1)
Q-T INTERVAL, ECG07: 304 MS
QRS DURATION, ECG06: 110 MS
QTC CALCULATION (BEZET), ECG08: 456 MS
RBC # BLD AUTO: 4.01 M/UL (ref 4.1–5.7)
SODIUM SERPL-SCNC: 140 MMOL/L (ref 136–145)
TSH SERPL DL<=0.05 MIU/L-ACNC: 1.6 UIU/ML (ref 0.36–3.74)
VENTRICULAR RATE, ECG03: 135 BPM
WBC # BLD AUTO: 7.5 K/UL (ref 4.1–11.1)

## 2017-08-01 PROCEDURE — 85610 PROTHROMBIN TIME: CPT | Performed by: INTERNAL MEDICINE

## 2017-08-01 PROCEDURE — 65660000000 HC RM CCU STEPDOWN

## 2017-08-01 PROCEDURE — 80048 BASIC METABOLIC PNL TOTAL CA: CPT | Performed by: INTERNAL MEDICINE

## 2017-08-01 PROCEDURE — 74011250637 HC RX REV CODE- 250/637: Performed by: NURSE PRACTITIONER

## 2017-08-01 PROCEDURE — 74011000250 HC RX REV CODE- 250: Performed by: EMERGENCY MEDICINE

## 2017-08-01 PROCEDURE — 36415 COLL VENOUS BLD VENIPUNCTURE: CPT | Performed by: INTERNAL MEDICINE

## 2017-08-01 PROCEDURE — 85027 COMPLETE CBC AUTOMATED: CPT | Performed by: INTERNAL MEDICINE

## 2017-08-01 PROCEDURE — 74011000258 HC RX REV CODE- 258: Performed by: EMERGENCY MEDICINE

## 2017-08-01 PROCEDURE — 83735 ASSAY OF MAGNESIUM: CPT | Performed by: INTERNAL MEDICINE

## 2017-08-01 PROCEDURE — 84443 ASSAY THYROID STIM HORMONE: CPT | Performed by: NURSE PRACTITIONER

## 2017-08-01 PROCEDURE — 93306 TTE W/DOPPLER COMPLETE: CPT | Performed by: INTERNAL MEDICINE

## 2017-08-01 RX ORDER — ENALAPRIL MALEATE 2.5 MG/1
2.5 TABLET ORAL DAILY
COMMUNITY

## 2017-08-01 RX ORDER — CARVEDILOL 6.25 MG/1
6.25 TABLET ORAL 2 TIMES DAILY WITH MEALS
Status: DISCONTINUED | OUTPATIENT
Start: 2017-08-01 | End: 2017-08-02 | Stop reason: HOSPADM

## 2017-08-01 RX ORDER — FEBUXOSTAT 40 MG/1
40 TABLET, FILM COATED ORAL DAILY
Status: DISCONTINUED | OUTPATIENT
Start: 2017-08-01 | End: 2017-08-02 | Stop reason: HOSPADM

## 2017-08-01 RX ORDER — ENALAPRIL MALEATE 5 MG/1
2.5 TABLET ORAL
Status: DISCONTINUED | OUTPATIENT
Start: 2017-08-01 | End: 2017-08-02 | Stop reason: HOSPADM

## 2017-08-01 RX ADMIN — CARVEDILOL 6.25 MG: 6.25 TABLET, FILM COATED ORAL at 12:32

## 2017-08-01 RX ADMIN — DILTIAZEM HYDROCHLORIDE 5 MG/HR: 5 INJECTION INTRAVENOUS at 12:35

## 2017-08-01 RX ADMIN — FEBUXOSTAT 40 MG: 40 TABLET ORAL at 12:32

## 2017-08-01 RX ADMIN — Medication 10 ML: at 05:03

## 2017-08-01 RX ADMIN — CARVEDILOL 6.25 MG: 6.25 TABLET, FILM COATED ORAL at 18:10

## 2017-08-01 RX ADMIN — Medication 10 ML: at 22:00

## 2017-08-01 NOTE — PROGRESS NOTES
8-1-2017 CASE MANAGEMENT NOTE:  I met with the pt (speaks limited English) and friend/co-worker, Hannah Ching ([phone]), to determine potential discharge needs. The pt lives alone in a third floor walk-up apartment. His wife and 2 adult children live in [place] and he returns there every 6 months to see them and his MD there. He is independent with his ADL's, works full-time and drives. He does see Dr. Mat Barrera, an MD with his company, who comes to the area every 6 months but really has no local PCP. I gave him a list of [facility] PCP's and encouraged him to find a local MD. He has prescription drug covrage and gets his medications from [facility] on [facility]. No discharge needs were identified. Care Management Interventions  PCP Verified by CM:  Yes (Goes to [facility] every 6 months to see his MD. Sania Marshall list of [facility] PCP's)  Discharge Durable Medical Equipment: No  Physical Therapy Consult: No  Occupational Therapy Consult: No  Speech Therapy Consult: No  Current Support Network: Lives Alone  Confirm Follow Up Transport: Friends  Plan discussed with Pt/Family/Caregiver: Yes  Discharge Location  Discharge Placement:  (Home)    BLANCA Morrison, CM

## 2017-08-01 NOTE — PROGRESS NOTES
[facility]: MED RECONCILIATION    Comments/Recommendations:    Patient presents with medication list in [facility]. Please see original note filled by a pharmacist on 7/31/17 22:54. The family member at the bedside was able to indicate that the patient was taking warfarin 3.75mg daily with the last dose of medication taken 7/31/17 in the morning.  Pharmacy met with Dr. Brinda Mckay, patient, and family at bedside  [facility] [employer] phone used interpretor Trish Callahan [phone]   Family at bedside read the characters to the interpretor via phone but the  had a difficult time translating the medication names into English. The family member was able to indicate that one of the medications was enalapril 2.5mg daily with the last dose 7/31   Pharmacist met with Claudia Villegas. a [facility] team member who is fluent in [address] [place] [address] Pharmacist and Sky Ortiz reviewed the medication list and Sky Ortiz entered with medications into a [facility] drug data base. Sky Ortiz was able to confirm the following:   Warfarin 3.75mg daily (already confirmed with the family member at bedside)   Enalapril 2.5mg daily (already confirmed with the family member at bedside)   Artist 10mg daily after breakfast (Artist is a global drug name for carvedilol per micro medex)   Artist 5mg daily after dinner (Artist is a global drug name for carvedilol per micro medex)   Urinom 50mg daily after breakfast (Verlee Garber is the global drug name for febuxostat)   All of the above information was reviewed with Tatum Salazar NP  Medications added:     · Carvedilol  · Enalapril  · Febuxostat    Allergies: Review of patient's allergies indicates no known allergies. Prior to Admission Medications:     Medication Documentation Review Audit       Reviewed by Dorice Claude, PHARMD (Pharmacist) on 08/01/17 at 1106         Medication Sig Documenting Provider Last Dose Status Taking? CARVEDILOL PO Take 10 mg by mouth daily (after breakfast).  Translated from medication list in [place] (medication name Artist) Historical Provider 7/31/2017 am Active Yes    CARVEDILOL PO Take 5 mg by mouth daily (after dinner). Historical Provider 7/30/2017 pm Active Yes    enalapril (VASOTEC) 2.5 mg tablet Take 2.5 mg by mouth daily. Historical Provider 7/31/2017 am Active Yes    FEBUXOSTAT PO Take 50 mg by mouth daily (after breakfast). Translated from medication list in [facility] (medication name Urinorm)  Historical Provider 7/31/2017 am Active Yes    warfarin (COUMADIN) 1 mg tablet Take 3 mg by mouth daily (with breakfast). Total dose 3.75mg daily every morning per patient Historical Provider 7/31/2017 AM Active Yes    warfarin (COUMADIN) tab Take 0.75 mg by mouth daily (with breakfast).  Total dose 3.75 mg every morning per patient Historical Provider 7/31/2017 AM Active Yes                  Thank you,    Melvin Zamora, PharmD, BCPS

## 2017-08-01 NOTE — PROGRESS NOTES
Cardiology Progress Note   [facility]      NAME:  Mery Solo   :   1970   MRN:   [id number]     Assessment/Plan:   1. A fib RVR:  Cont IV dilt for now until pharmacy is able to reconile OP meds from list written in [facility]. DCCV unsuccessful in the past twice first being 16 yrs ago. Ck TSH. Only has his INR checked when he is in [place], last being May 2017. Resume BB, wean IV dilt. 2. Possible mural thrombus at apex. INR 4.2 on coumadin for anticoagulation. 3. LV dysfunction: pt was told in May his heart was \"weak\" but doesn't know percentage. Has never had cardiac cath. Med mgt only. Resume coreg, enalapril. 4. Severe MR by ECHO :        Subjective: Mery Solo is a 48 y.o. male with AF, HTN presenting with 3 days of epigastric discomfort, palpitations and SOB. He first noticed his BP was elevated into the 464'Z systolic. He takes medications for AF however his paperwork/medications are all written in [facility]. He lives locally however travels back to [facility] every 4 months where his physicians are. He denies syncope. He was diagnosed with AF 16 years ago. He underwent stress testing 14 years ago. He does not see a local physician. He has been started on a cardizem gtt. Persistent a fib in the past and failed previous DCCV. Coreg dose  PTA 10mg am /5mg pm/ enalapril 2.5 mg every day      Discussion held with pt in presence of  [place]. Cardiac ROS: Patient denies any exertional chest pain, dyspnea, palpitations, syncope, orthopnea, edema or paroxysmal nocturnal dyspnea. Previous Cardiac Eval  ECHO 17  Left ventricle: Possible mural thrombus at apex. The ventricle was mildly  dilated. Systolic function was moderately to markedly reduced. Ejection  fraction was estimated to be 20 %. There was severe diffuse hypokinesis. There was akinesis of the apical wall(s). Wall thickness was at the upper  limits of normal.    Right ventricle:  The size was at the upper limits of normal. Systolic  function was mildly reduced. Left atrium: The atrium was severely dilated. Atrial septum: The septum bows from left to right, consistent with  increased left atrial pressure. Right atrium: The atrium was mildly dilated. Mitral valve: There was severe regurgitation. Review of Systems: No nausea, indigestion, vomiting, pain, cough, sputum. No bleeding. Taking po. Up in room. Objective:     Visit Vitals    BP (!) 134/93    Pulse 77    Temp 98.1 °F (36.7 °C)    Resp 14    Ht 5' 8\" (1.727 m)    Wt 180 lb (81.6 kg)    SpO2 90%    BMI 27.37 kg/m2      O2 Device: Room air    Temp (24hrs), Av.3 °F (36.8 °C), Min:98 °F (36.7 °C), Max:98.6 °F (37 °C)           1901 -  0700  In: 200 [P.O.:200]  Out: -   TELE: a fib variable rate    General: AAOx3 cooperative, no acute distress. HEENT: Atraumatic. Pink and moist.  Anicteric sclerae. Neck : Supple, no thyromegaly. Lungs: CTA bilaterally. No wheezing/rhonchi/rales. Heart: irregular rhythm, no murmur, No JVD. No carotid bruits. Abdomen: Soft, non-distended, non-tender. + Bowel sounds. Extremities: No edema  Neurologic: Grossly intact. Alert and oriented X 3. No acute neurological distress. Psych: Good insight. Not anxious or agitated.         Care Plan discussed with:    Comments   Patient x    Family      RN x    Care Manager                    Consultant:          Data Review:     No lab exists for component: ITNL   Recent Labs      17   1654   TROIQ  0.09*     Recent Labs      17   0043  17   1654   NA  140  138   K  3.8  4.0   CL  108  106   CO2  23  23   BUN  14  12   CREA  1.10  1.05   GLU  89  102*   MG  1.7  1.8   ALB   --   3.3*   WBC  7.5  6.1   HGB  13.4  14.6   HCT  39.8  42.7   PLT  203  218     Recent Labs      17   0043   INR  4.2*   PTP  44.8*       Medications reviewed  Current Facility-Administered Medications   Medication Dose Route Frequency    sodium chloride (NS) flush 5-10 mL  5-10 mL IntraVENous Q8H    sodium chloride (NS) flush 5-10 mL  5-10 mL IntraVENous PRN    dilTIAZem (CARDIZEM) 125 mg in dextrose 5% 125 mL infusion  0-15 mg/hr IntraVENous TITRATE         Kai Edwards NP

## 2017-08-01 NOTE — PROGRESS NOTES
[facility]: MED RECONCILIATION    Comments/Recommendations:    Patient presents with major language barrier and has a friend at bedside with medication list in [facility]. Patient does not have a PCP in the [place] and goes to [facility] several times a year to receive medications.  Friend attempted to translate medication list: states patient takes warfarin 3.75 mg (1 mg x 3 tablets, 0.5 mg x 1.5 tablets) every morning   Friend states other medications on list were for blood pressure to help with urination but the names of the medications did not translate into English so he was unsure of what they were. Patient also did not know. Unsure if these medications will be the same as medications in the [place], so if patient stays in the [address] [facility], will likely have to restart him on appropriate medications as per cardiology.  I spoke with Dr. Ant Marroquin and he said he will obtain a [facility]  in the morning to help decipher the medication list. He is aware of the medications. Stat INR ordered and eliquis was DCd since patient is apparently taking warfarin. Likely will bridge with lovenox if subtherapeutic or continue on warfarin if therapeutic. Medications added:     · Warfarin 1 mg x 3 tablets q AM  · Warfarin 0.5 mg x 1.5 tablets q AM    Information obtained from: friend, japanese medication list    Significant PMH/Disease States:   Past Medical History:   Diagnosis Date    Arrhythmia     Hypertension        Chief Complaint for this Admission:   Chief Complaint   Patient presents with    Rapid Heart Rate       Allergies: Review of patient's allergies indicates no known allergies. Prior to Admission Medications:   Prior to Admission Medications   Prescriptions Last Dose Informant Patient Reported? Taking?   warfarin (COUMADIN) 1 mg tablet 7/31/2017 at AM  Yes Yes   Sig: Take 3 mg by mouth daily (with breakfast).  Total dose 3.75mg daily every morning per patient   warfarin (COUMADIN) tab 7/31/2017 at AM Yes Yes   Sig: Take 0.75 mg by mouth daily (with breakfast).  Total dose 3.75 mg every morning per patient      Facility-Administered Medications: None             EUGENE Carrasco   Contact: [phone]

## 2017-08-01 NOTE — PROGRESS NOTES
[facility] Dosing Services  Consult for Warfarin Dosing by Pharmacy by Kyara Coffman NP  Indication: Afib  Day of Therapy: Continued from home (PTA warfarin 3.75 mg PO daily)  Dose to achieve an INR goal of 2-3    Order entered to hold warfarin this evening due to INR. Previous dose given 7/31 AM prior to admission, per med rec   PT/INR Lab Results   Component Value Date/Time    INR 4.2 08/01/2017 12:43 AM      Platelets Lab Results   Component Value Date/Time    PLATELET 662 80/38/2475 12:43 AM      H/H Lab Results   Component Value Date/Time    HGB 13.4 08/01/2017 12:43 AM        Pharmacy will follow daily and will provide subsequent warfarin dosing based on clinical status.     Thank you,  Deny Mendoza, PharmD, [facility]

## 2017-08-01 NOTE — ROUTINE PROCESS
1521  Primary Nurse Olamide Hernández, RN and Aayush Esparza, RN performed a dual skin assessment on this patient No impairment noted  Abel score is 23. Patient is [place] speaking with limited [address] Wife is still in [place]. Lives in a apartment on third level. 0298  Patient aware of NPO status. Verbalized understanding. 0519  Heart rate has fluctuated most of the night. Overall been in 70's to 80's but still goes into the low 100's even without movement. SBP in the 90's to low 100's. Patient has voided multiple times through out the night. 0730  Bedside and Verbal shift change report given to Olivia Loyola (oncoming nurse) by Tremayne García (offgoing nurse). Report included the following information SBAR, Kardex, ED Summary, Procedure Summary, Intake/Output, MAR, Recent Results and Cardiac Rhythm A Fib.

## 2017-08-01 NOTE — CARDIO/PULMONARY
Cardiac Rehab: Received consult for cardiac education on AFib & LV dysfunction. 51 yo Maltese-speaking male admitted with epigastric discomfort, SOB, palpitations, and AFib/RVR. LVEF 20% by echo (7/31/17), with severe LAE, severe MR, and mod-severe TR. Patient has been followed by physicians in [place]. Core Measures:  ACE/ARB - enalapril  BB - carvedilol  Statin - none  ASA - none  Prior to admission meds also included: warfarin (admission INR 4.2). Met briefly with Lupe Spaulding on [facility]; diltiazem gtt infusing. Blue  phone at bedside. Pt indicated he understands \"a little\" English. Provided HF education folder, with booklet on AFib. Pt indicated he was tired & wished to sleep. Therefore, teaching was deferred. UPDATE 8/2/2017: Pt was discharged prior to being seen by Cardiac Rehab RN today. Limited repeat echo with Definity was done today to further evaluate possible LV thrombus. Per Cardiology, pt should remain on Coumadin. Cardiac cath was recommended; however, pt declined. He also declined appt for recommended INR recheck; still has high INR 3.1 (8/2/17). Med teaching was provided by [facility] nurse with discharge instructions using .

## 2017-08-02 VITALS
TEMPERATURE: 98 F | OXYGEN SATURATION: 95 % | SYSTOLIC BLOOD PRESSURE: 101 MMHG | HEIGHT: 68 IN | HEART RATE: 95 BPM | RESPIRATION RATE: 16 BRPM | WEIGHT: 166.45 LBS | DIASTOLIC BLOOD PRESSURE: 52 MMHG | BODY MASS INDEX: 25.23 KG/M2

## 2017-08-02 LAB
ANION GAP BLD CALC-SCNC: 8 MMOL/L (ref 5–15)
BUN SERPL-MCNC: 10 MG/DL (ref 6–20)
BUN/CREAT SERPL: 10 (ref 12–20)
CALCIUM SERPL-MCNC: 8.5 MG/DL (ref 8.5–10.1)
CHLORIDE SERPL-SCNC: 106 MMOL/L (ref 97–108)
CO2 SERPL-SCNC: 25 MMOL/L (ref 21–32)
CREAT SERPL-MCNC: 0.99 MG/DL (ref 0.7–1.3)
ERYTHROCYTE [DISTWIDTH] IN BLOOD BY AUTOMATED COUNT: 14.8 % (ref 11.5–14.5)
GLUCOSE SERPL-MCNC: 93 MG/DL (ref 65–100)
HCT VFR BLD AUTO: 41.3 % (ref 36.6–50.3)
HGB BLD-MCNC: 13.9 G/DL (ref 12.1–17)
INR PPP: 3.1 (ref 0.9–1.1)
MAGNESIUM SERPL-MCNC: 1.9 MG/DL (ref 1.6–2.4)
MCH RBC QN AUTO: 33.4 PG (ref 26–34)
MCHC RBC AUTO-ENTMCNC: 33.7 G/DL (ref 30–36.5)
MCV RBC AUTO: 99.3 FL (ref 80–99)
PLATELET # BLD AUTO: 211 K/UL (ref 150–400)
POTASSIUM SERPL-SCNC: 3.5 MMOL/L (ref 3.5–5.1)
PROTHROMBIN TIME: 32.8 SEC (ref 9–11.1)
RBC # BLD AUTO: 4.16 M/UL (ref 4.1–5.7)
SODIUM SERPL-SCNC: 139 MMOL/L (ref 136–145)
WBC # BLD AUTO: 7.3 K/UL (ref 4.1–11.1)

## 2017-08-02 PROCEDURE — 85027 COMPLETE CBC AUTOMATED: CPT | Performed by: INTERNAL MEDICINE

## 2017-08-02 PROCEDURE — 80048 BASIC METABOLIC PNL TOTAL CA: CPT | Performed by: INTERNAL MEDICINE

## 2017-08-02 PROCEDURE — 74011250637 HC RX REV CODE- 250/637: Performed by: NURSE PRACTITIONER

## 2017-08-02 PROCEDURE — 36415 COLL VENOUS BLD VENIPUNCTURE: CPT | Performed by: INTERNAL MEDICINE

## 2017-08-02 PROCEDURE — 83735 ASSAY OF MAGNESIUM: CPT | Performed by: INTERNAL MEDICINE

## 2017-08-02 PROCEDURE — 74011250636 HC RX REV CODE- 250/636: Performed by: INTERNAL MEDICINE

## 2017-08-02 PROCEDURE — C8924 2D TTE W OR W/O FOL W/CON,FU: HCPCS

## 2017-08-02 PROCEDURE — 85610 PROTHROMBIN TIME: CPT | Performed by: NURSE PRACTITIONER

## 2017-08-02 RX ADMIN — CARVEDILOL 6.25 MG: 6.25 TABLET, FILM COATED ORAL at 08:38

## 2017-08-02 RX ADMIN — FEBUXOSTAT 40 MG: 40 TABLET ORAL at 08:41

## 2017-08-02 RX ADMIN — Medication 10 ML: at 06:04

## 2017-08-02 RX ADMIN — PERFLUTREN 2 ML: 6.52 INJECTION, SUSPENSION INTRAVENOUS at 10:11

## 2017-08-02 NOTE — PROGRESS NOTES
Discharge instructions were reviewed with patient using a Alona Dubose . Patient understood that he was to increase his carvedilol to 10 mg twice a day with breakfast and dinner. This was noted on his discharge instructions as well. All questions were answered. An appointment was made for him to Follow-up with Dr. Marta Araujo 8/9. IV and heart monitor were removed. Patient will be discharged home with his friend. Summary of Care was also provided for patient to give to his physician on [place].  MyChart was also explained by

## 2017-08-02 NOTE — PROGRESS NOTES
Pharmacist Discharge Medication Reconciliation    Discharge Provider:  Eusebia Groves       Discharge Medications:      Current Discharge Medication List      CONTINUE these medications which have CHANGED   * CARVEDILOL PO       Your last dose was: Your next dose is:              Dose:  10 mg   Take 10 mg by mouth daily (after dinner). Translated from medication list in [place] (medication name Artist)    Refills:  0                              CONTINUE these medications which have NOT CHANGED         Dose & Instructions Dispensing Information Comments   Morning Noon Evening Bedtime      * CARVEDILOL PO       Your last dose was: Your next dose is:              Dose:  10 mg   Take 10 mg by mouth daily (after breakfast). Translated from medication list in [place] (medication name Artist)    Refills:  0                                     enalapril 2.5 mg tablet   Commonly known as:  VASOTEC       Your last dose was: Your next dose is:              Dose:  2.5 mg   Take 2.5 mg by mouth daily. Refills:  0                         FEBUXOSTAT PO       Your last dose was: Your next dose is:              Dose:  50 mg   Take 50 mg by mouth daily (after breakfast). Translated from medication list in [place] (medication name Urinorm)    Refills:  0                         * warfarin 1 mg tablet   Commonly known as:  COUMADIN       Your last dose was: Your next dose is:              Dose:  3 mg   Take 3 mg by mouth daily (with breakfast). Total dose 3.75mg daily every morning per patient    Refills:  0                         * warfarin Tab   Commonly known as:  COUMADIN       Your last dose was: Your next dose is:              Dose:  0.75 mg   Take 0.75 mg by mouth daily (with breakfast). Total dose 3.75 mg every morning per patient    Refills:  0                         * Notice: This list has 4 medication(s) that are the same as other medications prescribed for you.  Read the directions carefully, and ask your doctor or other care provider to review them with you.           The patient's chart, MAR, and AVS were reviewed by   Charlee Gutierrez, [address], BCPS  Contact: [phone]

## 2017-08-02 NOTE — PROGRESS NOTES
Cardiology Progress Note   [facility]      NAME:  Breanna Amaya   :   1970   MRN:   [id number]     Assessment/Plan:   1. A fib RVR:  Rate better with inc BB. Stop IV dilt. Lengthy discussion via  # [id number] re: tx plan. He does not want to be in the hospital any longer and wants to be discharged. His INR is high. I asked if would come to our office to have INR ck in a few days and he declines. I will increase OP BB dose. 2. Possible mural thrombus at apex. ECHO images repeated today with definity today show that there may be a short pseudotendon bridging the apical portion of the LV cavity. There is an apical filling defect compatible with mural thrombus but not absolutely diagnostic thereof. He should remain on coumadin. 3. LV dysfunction: Unable to determine cause. No s/s CHF. Pt was told in May his heart was \"weak\" but doesn't know percentage. Has never had cardiac cath. Med mgt only. Resume coreg, enalapril. He refuses cardiac catheterization and OP follow up here in our office. \" I was told my heart was weak 16 yrs ago\" \" I m fine. \"    4. Severe MR by ECHO :  Needs OP follow up. Pt states he will go back to [facility] in December and follow up with his MD there. I arranged an  appt in a week in our office in  The event he reconsiders. Subjective: Breanna Amaya is a 48 y.o. male with AF, HTN presenting with 3 days of epigastric discomfort, palpitations and SOB. He first noticed his BP was elevated into the 313'E systolic. He takes medications for AF however his paperwork/medications are all written in [facility]. He lives locally however travels back to [facility] every 4 months where his physicians are. He denies syncope. He was diagnosed with AF 16 years ago. He underwent stress testing 14 years ago. He does not see a local physician. He has been started on a cardizem gtt. Persistent a fib in the past and failed previous DCCV.   Coreg dose  PTA 10mg am /5mg pm/ enalapril 2.5 mg every day      Discussion held with pt in presence of  [place]. Cardiac ROS: Patient denies any exertional chest pain, dyspnea, palpitations, syncope, orthopnea, edema or paroxysmal nocturnal dyspnea. Previous Cardiac Eval  ECHO 17  Left ventricle: Possible mural thrombus at apex. The ventricle was mildly  dilated. Systolic function was moderately to markedly reduced. Ejection  fraction was estimated to be 20 %. There was severe diffuse hypokinesis. There was akinesis of the apical wall(s). Wall thickness was at the upper  limits of normal.    Right ventricle: The size was at the upper limits of normal. Systolic  function was mildly reduced. Left atrium: The atrium was severely dilated. Atrial septum: The septum bows from left to right, consistent with  increased left atrial pressure. Right atrium: The atrium was mildly dilated. Mitral valve: There was severe regurgitation. Review of Systems: No nausea, indigestion, vomiting, pain, cough, sputum. No bleeding. Taking po. Up in room. Objective:     Visit Vitals    /52    Pulse 95    Temp 98 °F (36.7 °C)    Resp 16    Ht 5' 8\" (1.727 m)    Wt 166 lb 7.2 oz (75.5 kg)    SpO2 95%    BMI 25.31 kg/m2      O2 Device: Room air    Temp (24hrs), Av.2 °F (36.8 °C), Min:98 °F (36.7 °C), Max:98.4 °F (36.9 °C)            1901 -  0700  In: 400 [P.O.:400]  Out: 900 [Urine:900]  TELE: a fib rate 90's     General: AAOx3 cooperative, no acute distress. HEENT: Atraumatic. Pink and moist.  Anicteric sclerae. Neck : Supple, no thyromegaly. Lungs: CTA bilaterally. No wheezing/rhonchi/rales. Heart: irregular rhythm, no murmur, No JVD. No carotid bruits. Abdomen: Soft, non-distended, non-tender. + Bowel sounds. Extremities: No edema  Neurologic: Grossly intact. Alert and oriented X 3. Psych: Good insight. Not anxious or agitated.         Care Plan discussed with:    Comments   Patient x Family      RN x    Care Manager x                   Consultant:          Data Review:     No lab exists for component: ITNL   Recent Labs      07/31/17   1654   TROIQ  0.09*     Recent Labs      08/02/17   0016  08/01/17   0043  07/31/17   1654   NA  139  140  138   K  3.5  3.8  4.0   CL  106  108  106   CO2  25  23  23   BUN  10  14  12   CREA  0.99  1.10  1.05   GLU  93  89  102*   MG  1.9  1.7  1.8   ALB   --    --   3.3*   WBC  7.3  7.5  6.1   HGB  13.9  13.4  14.6   HCT  41.3  39.8  42.7   PLT  211  203  218     Recent Labs      08/02/17   0016  08/01/17   0043   INR  3.1*  4.2*   PTP  32.8*  44.8*       Medications reviewed  Current Facility-Administered Medications   Medication Dose Route Frequency    warfarin (COUMADIN) tablet 3 mg  3 mg Oral ONCE    enalapril (VASOTEC) tablet 2.5 mg  2.5 mg Oral QHS    carvedilol (COREG) tablet 6.25 mg  6.25 mg Oral BID WITH MEALS    febuxostat (ULORIC) tablet 40 mg  40 mg Oral DAILY    warfarin - Pharmacist to dose   Other Rx Dosing/Monitoring    sodium chloride (NS) flush 5-10 mL  5-10 mL IntraVENous Q8H    sodium chloride (NS) flush 5-10 mL  5-10 mL IntraVENous PRN         Giselle Reyes NP

## 2017-08-02 NOTE — PROGRESS NOTES
[facility] Dosing Services  Consult for Warfarin Dosing by Pharmacy by Minesh Corrales NP  Indication: Afib  Day of Therapy: Continued from home (PTA warfarin 3.75 mg PO daily)  Dose to achieve an INR goal of 2-3    Order entered for warfarin 3 mg PO today at 18:00. Significant drug interactions: None  Previous dose given 7/31 PTA   PT/INR Lab Results   Component Value Date/Time    INR 3.1 08/02/2017 12:16 AM      Platelets Lab Results   Component Value Date/Time    PLATELET 608 08/58/7059 12:16 AM      H/H Lab Results   Component Value Date/Time    HGB 13.9 08/02/2017 12:16 AM        Pharmacy will follow daily and will provide subsequent warfarin dosing based on clinical status.      Thank you,  Joanna Morocho, PharmD, [facility]

## 2017-08-02 NOTE — CDMP QUERY
1  Thank you for documenting LV dysfunction for this patient. Also documented is EF of 20 percent with diffuse hypokinesis, in patient being managed medically with po carvedilol. Can this be further specified as:    =>Chronic systolic heart failure in the setting of atrial fibrillation and history of \"weak heart\", being treated with po carvedilol. =>Other Explanation of clinical findings  =>Unable to Determine (no explanation of clinical findings)    The medical record reflects the following clinical findings, treatment, and risk factors:    Risk Factors: hypertension, afib, pt states \"history of weak heart\"    Clinical Indicators: 49 y/o male presents with diaphoresis and elevated heart rate, he is admitted for atrial fibrillation. Echo shows a possible mural thrombus at the apex, ventricle is mildly dilated, Systolic function is moderately to markedly reduced with EF of 20 percent. There is sever diffuse hypokinesis and mitral regurgitation. Treatment: po carvedilol     Please clarify and document your clinical opinion in the progress notes and discharge summary including the definitive and/or presumptive diagnosis, (suspected or probable), related to the above clinical findings. Please include clinical findings supporting your diagnosis. Thanks for your time.     Franco Foreman RN, BSN  [phone]

## 2017-08-02 NOTE — DISCHARGE INSTRUCTIONS
Increase carvedilol to 10 mg twice a day      Atrial Fibrillation: Care Instructions  Your Care Instructions    Atrial fibrillation is an irregular and often fast heartbeat. Treating this condition is important for several reasons. It can cause blood clots, which can travel from your heart to your brain and cause a stroke. If you have a fast heartbeat, you may feel lightheaded, dizzy, and weak. An irregular heartbeat can also increase your risk for heart failure. Atrial fibrillation is often the result of another heart condition, such as high blood pressure or coronary artery disease. Making changes to improve your heart condition will help you stay healthy and active. Follow-up care is a key part of your treatment and safety. Be sure to make and go to all appointments, and call your doctor if you are having problems. It's also a good idea to know your test results and keep a list of the medicines you take. How can you care for yourself at home? Medicines  · Take your medicines exactly as prescribed. Call your doctor if you think you are having a problem with your medicine. You will get more details on the specific medicines your doctor prescribes. · If your doctor has given you a blood thinner to prevent a stroke, be sure you get instructions about how to take your medicine safely. Blood thinners can cause serious bleeding problems. · Do not take any vitamins, over-the-counter drugs, or herbal products without talking to your doctor first.  Lifestyle changes  · Do not smoke. Smoking can increase your chance of a stroke and heart attack. If you need help quitting, talk to your doctor about stop-smoking programs and medicines. These can increase your chances of quitting for good. · Eat a heart-healthy diet. · Stay at a healthy weight. Lose weight if you need to. · Limit alcohol to 2 drinks a day for men and 1 drink a day for women. Too much alcohol can cause health problems. · Avoid colds and flu.  Get a pneumococcal vaccine shot. If you have had one before, ask your doctor whether you need another dose. Get a flu shot every year. If you must be around people with colds or flu, wash your hands often. Activity  · If your doctor recommends it, get more exercise. Walking is a good choice. Bit by bit, increase the amount you walk every day. Try for at least 30 minutes on most days of the week. You also may want to swim, bike, or do other activities. Your doctor may suggest that you join a cardiac rehabilitation program so that you can have help increasing your physical activity safely. · Start light exercise if your doctor says it is okay. Even a small amount will help you get stronger, have more energy, and manage stress. Walking is an easy way to get exercise. Start out by walking a little more than you did in the hospital. Gradually increase the amount you walk. · When you exercise, watch for signs that your heart is working too hard. You are pushing too hard if you cannot talk while you are exercising. If you become short of breath or dizzy or have chest pain, sit down and rest immediately. · Check your pulse regularly. Place two fingers on the artery at the palm side of your wrist, in line with your thumb. If your heartbeat seems uneven or fast, talk to your doctor. When should you call for help? Call 911 anytime you think you may need emergency care. For example, call if:  · You have symptoms of a heart attack. These may include:  ¨ Chest pain or pressure, or a strange feeling in the chest.  ¨ Sweating. ¨ Shortness of breath. ¨ Nausea or vomiting. ¨ Pain, pressure, or a strange feeling in the back, neck, jaw, or upper belly or in one or both shoulders or arms. ¨ Lightheadedness or sudden weakness. ¨ A fast or irregular heartbeat. After you call 911, the  may tell you to chew 1 adult-strength or 2 to 4 low-dose aspirin. Wait for an ambulance. Do not try to drive yourself.   · You have symptoms of a stroke. These may include:  ¨ Sudden numbness, tingling, weakness, or loss of movement in your face, arm, or leg, especially on only one side of your body. ¨ Sudden vision changes. ¨ Sudden trouble speaking. ¨ Sudden confusion or trouble understanding simple statements. ¨ Sudden problems with walking or balance. ¨ A sudden, severe headache that is different from past headaches. · You passed out (lost consciousness). Call your doctor now or seek immediate medical care if:  · You have new or increased shortness of breath. · You feel dizzy or lightheaded, or you feel like you may faint. · Your heart rate becomes irregular. · You can feel your heart flutter in your chest or skip heartbeats. Tell your doctor if these symptoms are new or worse. Watch closely for changes in your health, and be sure to contact your doctor if you have any problems. Where can you learn more? Go to http://remington-rose.info/. Enter U020 in the search box to learn more about \"Atrial Fibrillation: Care Instructions. \"  Current as of: September 21, 2016  Content Version: 11.3  © 5429-5429 [employer]. Care instructions adapted under license by [employer] (which disclaims liability or warranty for this information). If you have questions about a medical condition or this instruction, always ask your healthcare professional. [place] [address] any warranty or liability for your use of this information. High INR Test Result: Admission INR 4.2 (7/31/17). Your Care Instructions  You had a blood test to check how long it takes your blood to clot. This test is called a PT or prothrombin time test. The result of the test is called the INR level. A high INR level can happen when you take warfarin (Coumadin). Warfarin helps prevent blood clots. To do this, it slows the amount of time it takes for your blood to clot. This raises your INR level.  The INR goal for people who take warfarin is usually from 2 to 3. A value higher than 3.5 increases the risk of bleeding problems. Many things can affect the way warfarin works. Some natural health products and other medicines can make warfarin work too well. That can raise the risk of bleeding. If you drink a lot of alcohol, that may raise your INR. And severe diarrhea or vomiting can also raise your INR. The best way to lower your INR will depend on several things. In some cases, the doctor may have you stop taking warfarin for a few days. You may also be given other medicines to take. You will need to be tested often to make sure your INR level is going down. You will also need to watch for signs of bleeding. The doctor has checked you carefully, but problems can develop later. If you notice any problems or new symptoms, get medical treatment right away. Follow-up care is a key part of your treatment and safety. Be sure to make and go to all appointments, and call your doctor if you are having problems. It's also a good idea to know your test results and keep a list of the medicines you take. How can you care for yourself at home? Be careful with medicines and foods  · Don't start or stop taking any medicines, vitamins, or natural remedies unless you first talk to your doctor. · Keep the amount of vitamin K in your diet about the same from day to day. Do not suddenly eat a lot more or a lot less food that is rich in vitamin K than you usually do. Vitamin K affects how warfarin works and how your blood clots. · Limit your use of alcohol. Avoid bleeding by preventing falls  · Wear slippers or shoes with nonskid soles. · Remove throw rugs and clutter. · Rearrange furniture and electrical cords to keep them out of walking paths. · Keep stairways, porches, and outside walkways well lit. Use night-lights in hallways and bathrooms. · Be extra careful when you work with sharp tools or knives. When should you call for help?   Call [phone] anytime you think you may need emergency care. For example, call if:  · You have a sudden, severe headache that is different from past headaches. Call your doctor now or seek immediate medical care if:  · You have any abnormal bleeding, such as:  ¨ Nosebleeds. ¨ Vaginal bleeding that is different (heavier, more frequent, at a different time of the month) than what you are used to. ¨ Bloody or black stools, or rectal bleeding. ¨ Bloody or pink urine. Watch closely for changes in your health, and be sure to contact your doctor if you have any problems. Where can you learn more? Go to http://remington-rsoe.info/. Enter C178 in the search box to learn more about \"High INR Test Result: Care Instructions. \"  Current as of: October 11, 2016  Content Version: 11.3  © 4843-4507 [employer]. Care instructions adapted under license by [employer] (which disclaims liability or warranty for this information). If you have questions about a medical condition or this instruction, always ask your healthcare professional. [place] [address] any warranty or liability for your use of this information. Avoiding Triggers With Heart Failure: Care Instructions  Your Care Instructions  Triggers are anything that make your heart failure flare up. A flare-up is also called \"sudden heart failure\" or \"acute heart failure. \" When you have a flare-up, fluid builds up in your lungs, and you have problems breathing. You might need to go to the hospital. By watching for changes in your condition and avoiding triggers, you can prevent heart failure flare-ups. Follow-up care is a key part of your treatment and safety. Be sure to make and go to all appointments, and call your doctor if you are having problems. It's also a good idea to know your test results and keep a list of the medicines you take. How can you care for yourself at home?   Watch for changes in your weight and condition  · Weigh yourself without clothing at the same time each day. Record your weight. Call your doctor if you gain 3 pounds or more in 24 hrs or 5 pounds in one week. A sudden weight gain may mean that your heart failure is getting worse. · Keep a daily record of your symptoms. Write down any changes in how you feel, such as new shortness of breath, cough, or problems eating. Also record if your ankles are more swollen than usual and if you have to urinate in the night more often. Note anything that you ate or did that could have triggered these changes. Limit sodium  Sodium causes your body to hold on to water, making it harder for your heart to pump. People get most of their sodium from processed foods. Fast food and restaurant meals also tend to be very high in sodium. · Your doctor may suggest that you limit sodium to 1,500 milligrams (mg) a day. That is less than 1 teaspoon of salt a day, including all the salt you eat in cooking or in packaged foods. · Read food labels on cans and food packages. They tell you how much sodium you get in one serving. Check the serving size. If you eat more than one serving, you are getting more sodium. · Be aware that sodium can come in forms other than salt, including monosodium glutamate (MSG), sodium citrate, and sodium bicarbonate (baking soda). MSG is often added to Asian food. You can sometimes ask for food without MSG or salt. · Slowly reducing salt will help you adjust to the taste. Take the salt shaker off the table. · Flavor your food with garlic, lemon juice, onion, vinegar, herbs, and spices instead of salt. Do not use soy sauce, steak sauce, onion salt, garlic salt, mustard, or ketchup on your food, unless it is labeled \"low-sodium\" or \"low-salt. \"  · Make your own salad dressings, sauces, and ketchup without adding salt. · Use fresh or frozen ingredients, instead of canned ones, whenever you can. Choose low-sodium canned goods.   · Eat less processed food and food from restaurants, including fast food. Exercise as directed  Moderate, regular exercise is very good for your heart. It improves your blood flow and helps control your weight. But too much exercise can stress your heart and cause a heart failure flare-up. · Check with your doctor before you start an exercise program.  · Walking is an easy way to get exercise. Start out slowly. Gradually increase the length and pace of your walk. Swimming, riding a bike, and using a treadmill are also good forms of exercise. · When you exercise, watch for signs that your heart is working too hard. You are pushing yourself too hard if you cannot talk while you are exercising. If you become short of breath or dizzy or have chest pain, stop, sit down, and rest.  · Do not exercise when you do not feel well. Take medicines correctly  · Take your medicines exactly as prescribed. Call your doctor if you think you are having a problem with your medicine. · Make a list of all the medicines you take. Include those prescribed to you by other doctors and any over-the-counter medicines, vitamins, or supplements you take. Take this list with you when you go to any doctor. · Take your medicines at the same time every day. It may help you to post a list of all the medicines you take every day and what time of day you take them. · Make taking your medicine as simple as you can. Plan times to take your medicines when you are doing other things, such as eating a meal or getting ready for bed. This will make it easier to remember to take your medicines. · Get organized. Use helpful tools, such as daily or weekly pill containers. When should you call for help? Call 911 if you have symptoms of sudden heart failure such as:  · You have severe trouble breathing. · You cough up pink, foamy mucus. · You have a new irregular or rapid heartbeat.   Call your doctor now or seek immediate medical care if:  · You have new or increased shortness of breath. · You are dizzy or lightheaded, or you feel like you may faint. · You have sudden weight gain, such as 3 pounds in 24 hours, or 5 pounds in one week. · You have increased swelling in your legs, ankles, or feet. · You are suddenly so tired or weak that you cannot do your usual activities. Watch closely for changes in your health, and be sure to contact your doctor if you develop new symptoms. Where can you learn more? Go to http://remington-rose.info/  Enter V089 in the search box to learn more about \"Avoiding Triggers With Heart Failure: Care Instructions. \"  © 8818-8352 [employer]. Care instructions adapted under license by [employer] (which disclaims liability or warranty for this information). This care instruction is for use with your licensed healthcare professional. If you have questions about a medical condition or this instruction, always ask your healthcare professional. [place] [address] any warranty or liability for your use of this information. Content Version: 56.8.893503; Current as of: January 27, 2016 (modified 10/10/16).

## 2017-08-02 NOTE — PROGRESS NOTES
Bedside and Verbal shift change report given to Jossy Bhat RN (oncoming nurse) by Ralph Collado RN (offgoing nurse). Report included the following information SBAR, Kardex, Intake/Output, MAR, Recent Results and Cardiac Rhythm A FIb.         1930 Introduced self to patient via  service # [id number]. Patient denies pain at this time, patient states a dry throat and minor cough without sputum production, afebrile. Patient decline Tylenol at this time. Informed patient to notify RN if patient experiences any type of pain or  Needs assistance with toileting. Patient agreed. Also notified patient that based on HR and BP monitoring, Cardizem drip may be titrated to remain within safe parameters, patient understood. Will continue to monitor patient.

## 2017-08-02 NOTE — DISCHARGE SUMMARY
Cardiology Discharge Summary     Patient ID:       Natasha Larkin  [id number]  19 y.o.  1970    Admit Date: 7/31/2017    Discharge Date: 8/2/2017     Admitting Physician: Camron Maravilla MD   PCP: None    Discharge Physician: Dr Hall Chain: None                    Cardiac Rehab, Case management     Admission Diagnoses: Atrial fibrillation [facility])    Discharge Diagnoses: Active Problems:    Atrial fibrillation ([address] (7/31/2017)    LV dysfunction    Discharge Condition: Stable    Cardiology Procedures this Admission:  EchoCardiogram    Hospital Course: Natasha Larkin pw a fib RVR  and was admitted to high level telemetry unit for further observation and medical therapy. He was treated with a dilt IV and inc po BB dose. ECHO showed LVEF 20%, severe MR. Definity images indicated no thrombus as initial images may have indicated. Recommendations for additional testing including cardiac cathrterization to evaluate LV dysfunction were refused. OP follow up refused to re check INR and a fib rate. The patient was seen by cardiac rehab for education. Case mangement was consulted for discharge planning. The patient was kept apprised of his disease process and treatment plan of care via interpreters and cryacom phone. After receiving maximum benefit from his in-patient hospitalization, he was ready for discharge. At the time of discharge he was up ambulating and hemodynamically stable. Discharge Exam:     Visit Vitals    /52    Pulse 95    Temp 98 °F (36.7 °C)    Resp 16    Ht 5' 8\" (1.727 m)    Wt 166 lb 7.2 oz (75.5 kg)    SpO2 95%    BMI 25.31 kg/m2     See Final Progress Note    Disposition: home    Patient Instructions:   Current Discharge Medication List      CONTINUE these medications which have NOT CHANGED    Details   enalapril (VASOTEC) 2.5 mg tablet Take 2.5 mg by mouth daily. FEBUXOSTAT PO Take 50 mg by mouth daily (after breakfast).  Translated from medication list in [place] (medication name Urinorm)       !! CARVEDILOL PO Take 10 mg by mouth daily (after breakfast). Translated from medication list in [place] (medication name Artist)      !! CARVEDILOL PO Take 5 mg by mouth daily (after dinner). Translated from medication list in [place] (medication name Artist)      ! ! warfarin (COUMADIN) 1 mg tablet Take 3 mg by mouth daily (with breakfast). Total dose 3.75mg daily every morning per patient      !! warfarin (COUMADIN) tab Take 0.75 mg by mouth daily (with breakfast). Total dose 3.75 mg every morning per patient       !! - Potential duplicate medications found. Please discuss with provider. Referenced discharge instructions provided by nursing for diet and activity.     Follow-up with your MD    Signed:  Waylon Hammer NP  8/2/2017  10:45 AM

## 2017-08-03 ENCOUNTER — PATIENT OUTREACH (OUTPATIENT)
Dept: CARDIOLOGY CLINIC | Age: 47
End: 2017-08-03

## 2017-08-03 NOTE — PROGRESS NOTES
Reached out to patient and the number states it is no longer accepting calls. Called emergency contact Anthony Peck) listed and he stated the patient was not at home so he could not tell me how he was doing but that he would remind him of his apt next week on 8/9 with Dr. Jose Guy. Gave his friend my contact number and asked to have him call me. Friend verbally agreed to give him my contact information.

## 2017-08-09 ENCOUNTER — PATIENT OUTREACH (OUTPATIENT)
Dept: CARDIOLOGY CLINIC | Age: 47
End: 2017-08-09

## 2017-08-09 ENCOUNTER — CLINICAL SUPPORT (OUTPATIENT)
Dept: CARDIOLOGY CLINIC | Age: 47
End: 2017-08-09

## 2017-08-09 ENCOUNTER — OFFICE VISIT (OUTPATIENT)
Dept: CARDIOLOGY CLINIC | Age: 47
End: 2017-08-09

## 2017-08-09 VITALS
DIASTOLIC BLOOD PRESSURE: 74 MMHG | WEIGHT: 173.4 LBS | HEART RATE: 96 BPM | SYSTOLIC BLOOD PRESSURE: 120 MMHG | BODY MASS INDEX: 26.28 KG/M2 | RESPIRATION RATE: 16 BRPM | OXYGEN SATURATION: 98 % | HEIGHT: 68 IN

## 2017-08-09 DIAGNOSIS — I48.91 ATRIAL FIBRILLATION, UNSPECIFIED TYPE (HCC): Primary | ICD-10-CM

## 2017-08-09 DIAGNOSIS — I48.91 ATRIAL FIBRILLATION, UNSPECIFIED TYPE (HCC): ICD-10-CM

## 2017-08-09 DIAGNOSIS — Z79.01 LONG TERM (CURRENT) USE OF ANTICOAGULANTS: Primary | ICD-10-CM

## 2017-08-09 LAB
INR BLD: 3
INR, EXTERNAL: 3 (ref 2–3)
INR, EXTERNAL: 3 (ref 2–3)
PT POC: 35.8 SECONDS
VALID INTERNAL CONTROL?: YES

## 2017-08-09 RX ORDER — WARFARIN 7.5 MG/1
3.75 TABLET ORAL DAILY
COMMUNITY

## 2017-08-09 NOTE — PROGRESS NOTES
HISTORY OF PRESENTING ILLNESS      Shantel Huerta is a 50 y.o. male with long-standing persistent AF, hypertension, cardiomyopathy with LVEF 20%, mild RV dysfunction, severe mitral regurgitation who was recently hospitalized for atrial fibrillation with rapid ventricular response for which a Cardizem drip was initially started. He was transitioned to oral therapy. His INR was supratherapeutic. His carvedilol dose was adjusted from 10 mg/5 mg to 10 mg twice daily. It was unclear whether the patient had undergone an ischemic workup in the past and a left heart catheterization was recommended however the patient refused. He now presents for follow-up in clinic from his hospitalization. He denies chest pain, shortness of breath, lower extremity edema, syncope. EKG shows atrial fibrillation at 109 bpm.  This clinic visit was performed with the use of a . ACTIVE PROBLEM LIST     Patient Active Problem List    Diagnosis Date Noted    Atrial fibrillation ([facility] 07/31/2017           PAST MEDICAL HISTORY     Past Medical History:   Diagnosis Date    Arrhythmia     Hypertension            PAST SURGICAL HISTORY     No past surgical history on file. ALLERGIES     No Known Allergies       FAMILY HISTORY     No family history on file. negative for cardiac disease       SOCIAL HISTORY     Social History     Social History    Marital status:      Spouse name: N/A    Number of children: N/A    Years of education: N/A     Social History Main Topics    Smoking status: Never Smoker    Smokeless tobacco: Never Used    Alcohol use 12.6 oz/week     21 Cans of beer per week    Drug use: Not on file    Sexual activity: Not on file     Other Topics Concern    Not on file     Social History Narrative    No narrative on file         MEDICATIONS     Current Outpatient Prescriptions   Medication Sig    enalapril (VASOTEC) 2.5 mg tablet Take 2.5 mg by mouth daily.     FEBUXOSTAT PO Take 50 mg by mouth daily (after breakfast). Translated from medication list in [place] (medication name Urinorm)     CARVEDILOL PO Take 10 mg by mouth daily (after breakfast). Translated from medication list in [place] (medication name Artist)    CARVEDILOL PO Take 5 mg by mouth daily (after dinner). Translated from medication list in [facility] (medication name Artist)    warfarin (COUMADIN) 1 mg tablet Take 3 mg by mouth daily (with breakfast). Total dose 3.75mg daily every morning per patient    warfarin (COUMADIN) tab Take 0.75 mg by mouth daily (with breakfast). Total dose 3.75 mg every morning per patient     No current facility-administered medications for this visit. I have reviewed the nurses notes, vitals, problem list, allergy list, medical history, family, social history and medications. REVIEW OF SYMPTOMS      General: Pt denies excessive weight gain or loss. Pt is able to conduct ADL's  HEENT: Denies blurred vision, headaches, hearing loss, epistaxis and difficulty swallowing. Respiratory: Denies cough, congestion, shortness of breath, PEREA, wheezing or stridor. Cardiovascular: Denies precordial pain, palpitations, edema or PND  Gastrointestinal: Denies poor appetite, indigestion, abdominal pain or blood in stool         PHYSICAL EXAMINATION      There were no vitals filed for this visit. General: Well developed, in no acute distress. HEENT: No jaundice, oral mucosa moist, no oral ulcers  Neck: Supple, no stiffness, no lymphadenopathy, supple  Heart:  Irregularly irregular, no murmur, gallop or rub, no jugular venous distention  Respiratory: Clear bilaterally x 4, no wheezing or rales  Abdomen:   Soft, non-tender, bowel sounds are active.   Extremities:  No edema, normal cap refill, no cyanosis. Musculoskeletal: No clubbing, no deformities  Neuro: A&Ox3, speech clear, gait stable, cooperative, no focal neurologic deficits  Skin: Skin color is normal. No rashes or lesions.  Non diaphoretic, moist.  Vascular: 2+ pulses symmetric in all extremities       DIAGNOSTIC DATA      EKG: Atrial fibrillation at 109 bpm       LABORATORY DATA      Lab Results   Component Value Date/Time    WBC 7.3 08/02/2017 12:16 AM    HGB 13.9 08/02/2017 12:16 AM    HCT 41.3 08/02/2017 12:16 AM    PLATELET 211 39/91/2786 12:16 AM    MCV 99.3 08/02/2017 12:16 AM      Lab Results   Component Value Date/Time    Sodium 139 08/02/2017 12:16 AM    Potassium 3.5 08/02/2017 12:16 AM    Chloride 106 08/02/2017 12:16 AM    CO2 25 08/02/2017 12:16 AM    Anion gap 8 08/02/2017 12:16 AM    Glucose 93 08/02/2017 12:16 AM    BUN 10 08/02/2017 12:16 AM    Creatinine 0.99 08/02/2017 12:16 AM    BUN/Creatinine ratio 10 08/02/2017 12:16 AM    GFR est AA >60 08/02/2017 12:16 AM    GFR est non-AA >60 08/02/2017 12:16 AM    Calcium 8.5 08/02/2017 12:16 AM    Bilirubin, total 1.0 07/31/2017 04:54 PM    AST (SGOT) 57 07/31/2017 04:54 PM    Alk. phosphatase 62 07/31/2017 04:54 PM    Protein, total 6.5 07/31/2017 04:54 PM    Albumin 3.3 07/31/2017 04:54 PM    Globulin 3.2 07/31/2017 04:54 PM    A-G Ratio 1.0 07/31/2017 04:54 PM    ALT (SGPT) 121 07/31/2017 04:54 PM           ASSESSMENT       1. Atrial fibrillation                        A. Symptomatic                        B. CHADSVASC 3             C.  Long-standing persistent  2. Hypertension  3. Cardiomyopathy   A. Ischemic versus nonischemic? 4. Mitral regurgitation   A. Severe       PLAN     It is unclear whether the patient has been evaluated for ischemia in the past.  He had refused left heart catheterization. He also has mitral regurgitation which she reports being unaware of. Again it is unclear whether this is ischemic or nonischemic as well.   Will arrange to obtain patient's records from [place] with [place] translation and will provide patient my clinic note from today to send to his cardiologist in [place] for review so that we may compare notes to help patient determine the best course of action moving forward without duplication of procedures that may be unnecessary. If this cardiomyopathy and mitral regurgitation are new findings I would recommend left heart catheterization as well as evaluation by our valve clinic. I will check his INR today as this was supratherapeutic while he was in the hospital.  It is also unclear whether the patient has undergone evaluation for an ICD for primary prevention of sudden cardiac death. Once we have obtained his medical records will address these issues as appropriate.        FOLLOW-UP     3 months      Sunny Rodrigues MD  Cardiac Electrophysiology / Cardiology    [address]  [address] [place] [address], [address] [place] [address]  [phone] / [phone] Fax   [phone] / [phone] Fax

## 2017-08-09 NOTE — PROGRESS NOTES
Cardiology NN note- assisted INR clinic to establish correct dosage in order to document on INR calendar. He actually takes 3- 1 mg tablets and he has 0.75 mg strength tablet- he is here from [place]. Had to make it 7.5 mg strength tablet and he will cut 1/2 to get 3.75 mg.

## 2017-08-09 NOTE — MR AVS SNAPSHOT
Visit Information Date & Time Provider Department Dept. Phone Encounter #  
 8/9/2017  1:20 PM Juve Cho MD [facility] Shaneka Gomes [phone] [id number] Your Appointments 9/6/2017  8:20 AM  
COUMADIN CLINIC with MINERVA MEDINA  
[facility] ([facility] SCHEDULING) Appt Note: f/u sov$0 crf 09/06/17  
 [address] [facility]  
[phone]  
  
   
 [address] [facility] [address] [facility] [address]  
  
    
 11/29/2017  9:20 AM  
ESTABLISHED PATIENT with Juve Cho MD  
[facility] ([address]) Appt Note: 3 mo fu  
 [address] [facility] [address] [facility]  
[address] [address] [facility] [address] Upcoming Health Maintenance Date Due DTaP/Tdap/Td series (1 - Tdap) 8/1/1989 INFLUENZA AGE 9 TO ADULT 8/1/2017 Allergies as of 8/9/2017  Review Complete On: 8/9/2017 By: Juve Cho MD  
 No Known Allergies Current Immunizations  Never Reviewed No immunizations on file. Not reviewed this visit You Were Diagnosed With   
  
 Codes Comments Atrial fibrillation, unspecified type ([facility]    -  Primary ICD-10-CM: I48.91 
ICD-9-CM: 427.31 Vitals BP Pulse Resp Height(growth percentile) Weight(growth percentile) SpO2  
 120/74 (BP 1 Location: Left arm, BP Patient Position: Sitting) 96 16 5' 8\" (1.727 m) 173 lb 6.4 oz (78.7 kg) 98% BMI Smoking Status 26.37 kg/m2 Never Smoker Vitals History BMI and BSA Data Body Mass Index Body Surface Area  
 26.37 kg/m 2 1.94 m 2 Preferred Pharmacy Pharmacy Name Phone [facility] [phone] Your Updated Medication List  
  
   
This list is accurate as of: 8/9/17  3:02 PM.  Always use your most recent med list.  
  
  
  
  
 * CARVEDILOL PO Take 10 mg by mouth daily (after breakfast). Translated from medication list in [place] (medication name Artist) * CARVEDILOL PO Take 5 mg by mouth daily (after dinner). Translated from medication list in [place] (medication name Artist)  
  
 enalapril 2.5 mg tablet Commonly known as:  Leal Games Take 2.5 mg by mouth daily. FEBUXOSTAT PO Take 50 mg by mouth daily (after breakfast). Translated from medication list in [facility] (medication name Prentis Grate) * warfarin 1 mg tablet Commonly known as:  COUMADIN Take 3 mg by mouth daily (with breakfast). Total dose 3.75mg daily every morning per patient * warfarin Tab Commonly known as:  COUMADIN Take 0.75 mg by mouth daily (with breakfast). Total dose 3.75 mg every morning per patient * Notice: This list has 4 medication(s) that are the same as other medications prescribed for you. Read the directions carefully, and ask your doctor or other care provider to review them with you. We Performed the Following AMB POC EKG ROUTINE W/ 12 LEADS, INTER & REP [23646 CPT(R)] Introducing [facility] SERVICES! Dear Ariella Haddad: 
Thank you for requesting a ShapeUp account. Our records indicate that you already have an active ShapeUp account. You can access your account anytime at [email or web address] Did you know that you can access your hospital and ER discharge instructions at any time in ShapeUp? You can also review all of your test results from your hospital stay or ER visit. Additional Information If you have questions, please visit the Frequently Asked Questions section of the ShapeUp website at [email or web address] Remember, ShapeUp is NOT to be used for urgent needs. For medical emergencies, dial 911. Now available from your iPhone and Android! Please provide this summary of care documentation to your next provider. Your primary care clinician is listed as NONE. If you have any questions after today's visit, please call [phone].

## 2017-08-09 NOTE — PROGRESS NOTES
Visit Vitals    /74 (BP 1 Location: Left arm, BP Patient Position: Sitting)    Pulse 96    Resp 16    Ht 5' 8\" (1.727 m)    Wt 173 lb 6.4 oz (78.7 kg)    SpO2 98%    BMI 26.37 kg/m2

## 2017-09-06 ENCOUNTER — CLINICAL SUPPORT (OUTPATIENT)
Dept: CARDIOLOGY CLINIC | Age: 47
End: 2017-09-06

## 2017-09-06 DIAGNOSIS — I48.91 ATRIAL FIBRILLATION, UNSPECIFIED TYPE (HCC): ICD-10-CM

## 2017-09-06 DIAGNOSIS — Z79.01 LONG TERM (CURRENT) USE OF ANTICOAGULANTS: Primary | ICD-10-CM

## 2017-09-06 LAB
INR BLD: 5.2
INR, EXTERNAL: 5.2 (ref 2–3)
PT POC: 61.9 SECONDS
VALID INTERNAL CONTROL?: YES

## 2017-09-14 ENCOUNTER — CLINICAL SUPPORT (OUTPATIENT)
Dept: CARDIOLOGY CLINIC | Age: 47
End: 2017-09-14

## 2017-09-14 DIAGNOSIS — I48.91 ATRIAL FIBRILLATION, UNSPECIFIED TYPE (HCC): ICD-10-CM

## 2017-09-14 DIAGNOSIS — Z79.01 LONG TERM (CURRENT) USE OF ANTICOAGULANTS: Primary | ICD-10-CM

## 2017-09-14 LAB
INR BLD: 3.4
INR, EXTERNAL: 3.4 (ref 2–3)
PT POC: 40.8 SECONDS
VALID INTERNAL CONTROL?: YES

## 2017-09-22 ENCOUNTER — CLINICAL SUPPORT (OUTPATIENT)
Dept: CARDIOLOGY CLINIC | Age: 47
End: 2017-09-22

## 2017-09-22 DIAGNOSIS — I48.91 ATRIAL FIBRILLATION, UNSPECIFIED TYPE (HCC): ICD-10-CM

## 2017-09-22 DIAGNOSIS — Z79.01 LONG TERM (CURRENT) USE OF ANTICOAGULANTS: Primary | ICD-10-CM

## 2017-09-22 LAB
INR BLD: 4.6
INR, EXTERNAL: 4.6 (ref 2–3)
PT POC: 55 SECONDS
VALID INTERNAL CONTROL?: YES

## 2017-09-29 ENCOUNTER — CLINICAL SUPPORT (OUTPATIENT)
Dept: CARDIOLOGY CLINIC | Age: 47
End: 2017-09-29

## 2017-09-29 DIAGNOSIS — Z79.01 LONG TERM (CURRENT) USE OF ANTICOAGULANTS: Primary | ICD-10-CM

## 2017-09-29 DIAGNOSIS — I48.91 ATRIAL FIBRILLATION, UNSPECIFIED TYPE (HCC): ICD-10-CM

## 2017-09-29 LAB
INR BLD: 1.9
INR, EXTERNAL: 1.9 (ref 2–3)
PT POC: 22.5 SECONDS
VALID INTERNAL CONTROL?: YES

## 2017-10-17 ENCOUNTER — CLINICAL SUPPORT (OUTPATIENT)
Dept: CARDIOLOGY CLINIC | Age: 47
End: 2017-10-17

## 2017-10-17 DIAGNOSIS — I48.91 ATRIAL FIBRILLATION, UNSPECIFIED TYPE (HCC): ICD-10-CM

## 2017-10-17 DIAGNOSIS — Z79.01 LONG-TERM (CURRENT) USE OF ANTICOAGULANTS: Primary | ICD-10-CM

## 2017-10-17 LAB
INR BLD: 2.2
INR, EXTERNAL: 2.2 (ref 2–3)
PT POC: 26.4 SECONDS
VALID INTERNAL CONTROL?: YES

## 2017-11-02 ENCOUNTER — CLINICAL SUPPORT (OUTPATIENT)
Dept: CARDIOLOGY CLINIC | Age: 47
End: 2017-11-02

## 2017-11-02 DIAGNOSIS — I48.91 ATRIAL FIBRILLATION, UNSPECIFIED TYPE (HCC): ICD-10-CM

## 2017-11-02 DIAGNOSIS — Z79.01 LONG-TERM (CURRENT) USE OF ANTICOAGULANTS: Primary | ICD-10-CM

## 2017-11-02 LAB
INR BLD: 2.3
INR, EXTERNAL: 2.3 (ref 2–3)
PT POC: 27.2 SECONDS
VALID INTERNAL CONTROL?: YES

## 2017-11-09 ENCOUNTER — CLINICAL SUPPORT (OUTPATIENT)
Dept: CARDIOLOGY CLINIC | Age: 47
End: 2017-11-09

## 2017-11-09 DIAGNOSIS — I48.91 ATRIAL FIBRILLATION, UNSPECIFIED TYPE (HCC): ICD-10-CM

## 2017-11-09 DIAGNOSIS — Z79.01 LONG-TERM (CURRENT) USE OF ANTICOAGULANTS: Primary | ICD-10-CM

## 2017-11-09 LAB
INR BLD: 2.2
INR, EXTERNAL: 2.2 (ref 2–3)
PT POC: 26.5 SECONDS
VALID INTERNAL CONTROL?: YES

## 2017-11-29 ENCOUNTER — OFFICE VISIT (OUTPATIENT)
Dept: CARDIOLOGY CLINIC | Age: 47
End: 2017-11-29

## 2017-11-29 ENCOUNTER — CLINICAL SUPPORT (OUTPATIENT)
Dept: CARDIOLOGY CLINIC | Age: 47
End: 2017-11-29

## 2017-11-29 VITALS
SYSTOLIC BLOOD PRESSURE: 120 MMHG | BODY MASS INDEX: 24.86 KG/M2 | OXYGEN SATURATION: 98 % | DIASTOLIC BLOOD PRESSURE: 80 MMHG | WEIGHT: 164 LBS | HEART RATE: 85 BPM | HEIGHT: 68 IN

## 2017-11-29 DIAGNOSIS — Z79.01 LONG-TERM (CURRENT) USE OF ANTICOAGULANTS: Primary | ICD-10-CM

## 2017-11-29 DIAGNOSIS — I48.91 ATRIAL FIBRILLATION, UNSPECIFIED TYPE (HCC): ICD-10-CM

## 2017-11-29 DIAGNOSIS — I48.91 ATRIAL FIBRILLATION, UNSPECIFIED TYPE (HCC): Primary | ICD-10-CM

## 2017-11-29 LAB
INR BLD: 2.2
INR, EXTERNAL: 2.2 (ref 2–3)
PT POC: 26.6 SECONDS
VALID INTERNAL CONTROL?: YES

## 2017-11-29 NOTE — PROGRESS NOTES
Visit Vitals    /80 (BP 1 Location: Left arm, BP Patient Position: Sitting)    Pulse 85    Ht 5' 8\" (1.727 m)    Wt 164 lb (74.4 kg)    SpO2 98%    BMI 24.94 kg/m2

## 2017-12-28 ENCOUNTER — CLINICAL SUPPORT (OUTPATIENT)
Dept: CARDIOLOGY CLINIC | Age: 47
End: 2017-12-28

## 2017-12-28 DIAGNOSIS — Z79.01 LONG-TERM (CURRENT) USE OF ANTICOAGULANTS: Primary | ICD-10-CM

## 2017-12-28 DIAGNOSIS — I48.91 ATRIAL FIBRILLATION, UNSPECIFIED TYPE (HCC): ICD-10-CM

## 2017-12-28 LAB
INR BLD: 2
INR, EXTERNAL: 2 (ref 2–3)
PT POC: 22.7 SECONDS
VALID INTERNAL CONTROL?: YES

## 2018-01-19 ENCOUNTER — CLINICAL SUPPORT (OUTPATIENT)
Dept: CARDIOLOGY CLINIC | Age: 48
End: 2018-01-19

## 2018-01-19 DIAGNOSIS — Z79.01 LONG-TERM (CURRENT) USE OF ANTICOAGULANTS: Primary | ICD-10-CM

## 2018-01-19 DIAGNOSIS — I48.91 ATRIAL FIBRILLATION, UNSPECIFIED TYPE (HCC): ICD-10-CM

## 2018-01-19 LAB
INR BLD: 1.5
INR, EXTERNAL: 1.5 (ref 2–3)
PT POC: 17.8 SECONDS
VALID INTERNAL CONTROL?: YES

## 2018-01-23 ENCOUNTER — CLINICAL SUPPORT (OUTPATIENT)
Dept: CARDIOLOGY CLINIC | Age: 48
End: 2018-01-23

## 2018-01-23 DIAGNOSIS — I48.91 ATRIAL FIBRILLATION, UNSPECIFIED TYPE (HCC): ICD-10-CM

## 2018-01-23 DIAGNOSIS — Z79.01 LONG-TERM (CURRENT) USE OF ANTICOAGULANTS: Primary | ICD-10-CM

## 2018-01-23 LAB
INR BLD: 1.9
INR, EXTERNAL: 1.9 (ref 2–3)
PT POC: 20.1 SECONDS
VALID INTERNAL CONTROL?: YES

## 2018-01-31 ENCOUNTER — CLINICAL SUPPORT (OUTPATIENT)
Dept: CARDIOLOGY CLINIC | Age: 48
End: 2018-01-31

## 2018-01-31 DIAGNOSIS — Z79.01 LONG-TERM (CURRENT) USE OF ANTICOAGULANTS: Primary | ICD-10-CM

## 2018-01-31 DIAGNOSIS — I48.91 ATRIAL FIBRILLATION, UNSPECIFIED TYPE (HCC): ICD-10-CM

## 2018-01-31 LAB
INR BLD: 2
INR, EXTERNAL: 2 (ref 2–3)
PT POC: 24.2 SECONDS
VALID INTERNAL CONTROL?: YES

## 2018-02-21 ENCOUNTER — OFFICE VISIT (OUTPATIENT)
Dept: CARDIOLOGY CLINIC | Age: 48
End: 2018-02-21

## 2018-02-21 ENCOUNTER — CLINICAL SUPPORT (OUTPATIENT)
Dept: CARDIOLOGY CLINIC | Age: 48
End: 2018-02-21

## 2018-02-21 VITALS — WEIGHT: 169 LBS | HEIGHT: 68 IN | RESPIRATION RATE: 16 BRPM | BODY MASS INDEX: 25.61 KG/M2

## 2018-02-21 DIAGNOSIS — I48.91 ATRIAL FIBRILLATION, UNSPECIFIED TYPE (HCC): ICD-10-CM

## 2018-02-21 DIAGNOSIS — I48.0 PAROXYSMAL ATRIAL FIBRILLATION (HCC): Primary | ICD-10-CM

## 2018-02-21 DIAGNOSIS — Z79.01 LONG-TERM (CURRENT) USE OF ANTICOAGULANTS: Primary | ICD-10-CM

## 2018-02-21 LAB
INR BLD: 1.9
INR, EXTERNAL: 1.9 (ref 2–3)
PT POC: 23.1 SECONDS
VALID INTERNAL CONTROL?: YES

## 2018-02-21 NOTE — PROGRESS NOTES
Chief Complaint   Patient presents with    Follow-up     afib       1. Have you been to the ER, urgent care clinic since your last visit? Hospitalized since your last visit? No    2. Have you seen or consulted any other health care providers outside of the [address] [place] [address] since your last visit? Include any pap smears or colon screening.  No

## 2018-02-21 NOTE — MR AVS SNAPSHOT
[address] [facility] 
[phone] Patient: So Thompson MRN: [id number] Visit Information Date & Time Provider Department Dept. Phone Encounter #  
 2/21/2018  9:20 AM Malika Graham MD [facility] Kelli Montoya [phone] [id number] Your Appointments 2/26/2018  7:40 AM  
COUMADIN CLINIC with MINERVA MEDINA  
[facility] ([facility] SCHEDULING) Appt Note: f/u sov$0 crf 02/21/18; f/u sov$0 crf 02/21/18  
 [address] [facility]  
[address] [address] [address] [facility] Upcoming Health Maintenance Date Due DTaP/Tdap/Td series (1 - Tdap) 8/1/1989 Influenza Age 5 to Adult 8/1/2017 Allergies as of 2/21/2018  Review Complete On: 2/21/2018 By: Duglas Traylor LPN No Known Allergies Current Immunizations  Never Reviewed No immunizations on file. Not reviewed this visit You Were Diagnosed With   
  
 Codes Comments Paroxysmal atrial fibrillation (HCC)    -  Primary ICD-10-CM: I48.0 ICD-9-CM: 427.31 Vitals Resp Height(growth percentile) Weight(growth percentile) BMI Smoking Status 16 5' 8\" (1.727 m) 169 lb (76.7 kg) 25.7 kg/m2 Never Smoker BMI and BSA Data Body Mass Index Body Surface Area 25.7 kg/m 2 1.92 m 2 Preferred Pharmacy Pharmacy Name Phone [address] [place]a [place] [phone] Your Updated Medication List  
  
   
This list is accurate as of 2/21/18 10:52 AM.  Always use your most recent med list.  
  
  
  
  
 * CARVEDILOL PO Take 10 mg by mouth daily (after breakfast). Translated from medication list in [place] (medication name Artist) * CARVEDILOL PO Take 5 mg by mouth daily (after dinner).  Translated from medication list in [place] (medication name Artist)  
  
 enalapril 2.5 mg tablet Commonly known as:  Darell Rafy Take 2.5 mg by mouth daily. FEBUXOSTAT PO Take 50 mg by mouth daily (after breakfast). Translated from medication list in [place] (medication name Urinorm)  
  
 warfarin 7.5 mg tablet Commonly known as:  COUMADIN Take 3.75 mg by mouth daily. 1/2 tablet daily * Notice: This list has 2 medication(s) that are the same as other medications prescribed for you. Read the directions carefully, and ask your doctor or other care provider to review them with you. We Performed the Following AMB POC EKG ROUTINE W/ 12 LEADS, INTER & REP [10018 CPT(R)] Introducing [facility] & HEALTH SERVICES! Dear Britany Infante: 
Thank you for requesting a AdKeeper account. Our records indicate that you already have an active AdKeeper account. You can access your account anytime at [email or web address] Did you know that you can access your hospital and ER discharge instructions at any time in AdKeeper? You can also review all of your test results from your hospital stay or ER visit. Additional Information If you have questions, please visit the Frequently Asked Questions section of the AdKeeper website at [email or web address] Remember, AdKeeper is NOT to be used for urgent needs. For medical emergencies, dial 911. Now available from your iPhone and Android! Please provide this summary of care documentation to your next provider. Your primary care clinician is listed as NONE. If you have any questions after today's visit, please call [phone].

## 2018-02-21 NOTE — PROGRESS NOTES
HISTORY OF PRESENTING ILLNESS      Genaro Hudson is a 50 y.o. male with long-standing persistent AF, hypertension, cardiomyopathy with LVEF 20%, mild RV dysfunction, severe mitral regurgitation presenting for follow up of his AF and cardiomyopathy. His carvedilol dose was adjusted from 10 mg/5 mg to 10 mg twice daily. It was unclear whether the patient had undergone an ischemic workup in the past and a left heart catheterization was recommended however the patient refused. ACTIVE PROBLEM LIST     Patient Active Problem List    Diagnosis Date Noted    Atrial fibrillation ([address] 07/31/2017           PAST MEDICAL HISTORY     Past Medical History:   Diagnosis Date    Arrhythmia     Hypertension            PAST SURGICAL HISTORY     No past surgical history on file. ALLERGIES     No Known Allergies       FAMILY HISTORY     No family history on file. negative for cardiac disease       SOCIAL HISTORY     Social History     Social History    Marital status:      Spouse name: N/A    Number of children: N/A    Years of education: N/A     Social History Main Topics    Smoking status: Never Smoker    Smokeless tobacco: Never Used    Alcohol use 12.6 oz/week     21 Cans of beer per week    Drug use: Not on file    Sexual activity: Not on file     Other Topics Concern    Not on file     Social History Narrative         MEDICATIONS     Current Outpatient Prescriptions   Medication Sig    warfarin (COUMADIN) 7.5 mg tablet Take 3.75 mg by mouth daily. 1/2 tablet daily    enalapril (VASOTEC) 2.5 mg tablet Take 2.5 mg by mouth daily.  FEBUXOSTAT PO Take 50 mg by mouth daily (after breakfast). Translated from medication list in [place] (medication name Urinorm)     CARVEDILOL PO Take 10 mg by mouth daily (after breakfast). Translated from medication list in [place] (medication name Artist)    CARVEDILOL PO Take 5 mg by mouth daily (after dinner).  Translated from medication list in [place] (medication name Artist)     No current facility-administered medications for this visit. I have reviewed the nurses notes, vitals, problem list, allergy list, medical history, family, social history and medications. REVIEW OF SYMPTOMS      General: Pt denies excessive weight gain or loss. Pt is able to conduct ADL's  HEENT: Denies blurred vision, headaches, hearing loss, epistaxis and difficulty swallowing. Respiratory: Denies cough, congestion, shortness of breath, PEREA, wheezing or stridor. Cardiovascular: Denies precordial pain, palpitations, edema or PND  Gastrointestinal: Denies poor appetite, indigestion, abdominal pain or blood in stool  Genitourinary: Denies hematuria, dysuria, increased urinary frequency  Musculoskeletal: Denies joint pain or swelling from muscles or joints  Neurologic: Denies tremor, paresthesias, headache, or sensory motor disturbance  Psychiatric: Denies confusion, insomnia, depression  Integumentray: Denies rash, itching or ulcers. Hematologic: Denies easy bruising, bleeding       PHYSICAL EXAMINATION      There were no vitals filed for this visit. General: Well developed, in no acute distress. HEENT: No jaundice, oral mucosa moist, no oral ulcers  Neck: Supple, no stiffness, no lymphadenopathy, supple  Heart:  Normal S1/S2 negative S3 or S4. Regular, no murmur, gallop or rub, no jugular venous distention  Respiratory: Clear bilaterally x 4, no wheezing or rales  Abdomen:   Soft, non-tender, bowel sounds are active.   Extremities:  No edema, normal cap refill, no cyanosis. Musculoskeletal: No clubbing, no deformities  Neuro: A&Ox3, speech clear, gait stable, cooperative, no focal neurologic deficits  Skin: Skin color is normal. No rashes or lesions.  Non diaphoretic, moist.  Vascular: 2+ pulses symmetric in all extremities       DIAGNOSTIC DATA      EKG:        LABORATORY DATA      Lab Results   Component Value Date/Time    WBC 7.3 08/02/2017 12:16 AM    HGB 13.9 08/02/2017 12:16 AM    HCT 41.3 08/02/2017 12:16 AM    PLATELET 158 43/96/6741 12:16 AM    MCV 99.3 (H) 08/02/2017 12:16 AM      Lab Results   Component Value Date/Time    Sodium 139 08/02/2017 12:16 AM    Potassium 3.5 08/02/2017 12:16 AM    Chloride 106 08/02/2017 12:16 AM    CO2 25 08/02/2017 12:16 AM    Anion gap 8 08/02/2017 12:16 AM    Glucose 93 08/02/2017 12:16 AM    BUN 10 08/02/2017 12:16 AM    Creatinine 0.99 08/02/2017 12:16 AM    BUN/Creatinine ratio 10 (L) 08/02/2017 12:16 AM    GFR est AA >60 08/02/2017 12:16 AM    GFR est non-AA >60 08/02/2017 12:16 AM    Calcium 8.5 08/02/2017 12:16 AM    Bilirubin, total 1.0 07/31/2017 04:54 PM    AST (SGOT) 57 (H) 07/31/2017 04:54 PM    Alk. phosphatase 62 07/31/2017 04:54 PM    Protein, total 6.5 07/31/2017 04:54 PM    Albumin 3.3 (L) 07/31/2017 04:54 PM    Globulin 3.2 07/31/2017 04:54 PM    A-G Ratio 1.0 (L) 07/31/2017 04:54 PM    ALT (SGPT) 121 (H) 07/31/2017 04:54 PM           ASSESSMENT      1. Atrial fibrillation                        A. Symptomatic                        B. CHADSVASC 3                                  C.  Long-standing persistent  2. Hypertension  3. Cardiomyopathy                        A. Ischemic versus nonischemic? 4. Mitral regurgitation                        A.  Severe       PLAN     Will contact patient's cardiologist in [place] (Dr. Ann-Marie Marion) to discuss his case further.          FOLLOW-UP     TBD        Aftab Carmichael MD  Cardiac Electrophysiology / Cardiology    [facility] [address]  [address] [facility], [facility], [address] [place] [address], [address] [place] [address] Drive    [facility]  [phone] / [phone] Fax   [phone] / [phone] Fax

## 2018-02-26 ENCOUNTER — CLINICAL SUPPORT (OUTPATIENT)
Dept: CARDIOLOGY CLINIC | Age: 48
End: 2018-02-26

## 2018-02-26 DIAGNOSIS — I48.91 ATRIAL FIBRILLATION, UNSPECIFIED TYPE (HCC): ICD-10-CM

## 2018-02-26 DIAGNOSIS — Z79.01 LONG-TERM (CURRENT) USE OF ANTICOAGULANTS: Primary | ICD-10-CM

## 2018-02-26 LAB
INR BLD: 1.4
INR, EXTERNAL: 1.4 (ref 2–3)
PT POC: 16.3 SECONDS
VALID INTERNAL CONTROL?: YES

## 2018-03-05 ENCOUNTER — CLINICAL SUPPORT (OUTPATIENT)
Dept: CARDIOLOGY CLINIC | Age: 48
End: 2018-03-05

## 2018-03-05 DIAGNOSIS — I48.91 ATRIAL FIBRILLATION, UNSPECIFIED TYPE (HCC): ICD-10-CM

## 2018-03-05 DIAGNOSIS — Z79.01 LONG-TERM (CURRENT) USE OF ANTICOAGULANTS: Primary | ICD-10-CM

## 2018-03-05 LAB
INR BLD: 2.4
INR, EXTERNAL: 2.4 (ref 2–3)
PT POC: 28.5 SECONDS
VALID INTERNAL CONTROL?: YES

## 2018-04-06 ENCOUNTER — CLINICAL SUPPORT (OUTPATIENT)
Dept: CARDIOLOGY CLINIC | Age: 48
End: 2018-04-06

## 2018-04-06 DIAGNOSIS — I48.91 ATRIAL FIBRILLATION, UNSPECIFIED TYPE (HCC): ICD-10-CM

## 2018-04-06 DIAGNOSIS — Z79.01 LONG TERM (CURRENT) USE OF ANTICOAGULANTS: Primary | ICD-10-CM

## 2018-04-06 LAB
INR BLD: 2.2
INR, EXTERNAL: 2.2 (ref 2–3)
PT POC: 26.6 SECONDS
VALID INTERNAL CONTROL?: YES

## 2018-05-09 ENCOUNTER — CLINICAL SUPPORT (OUTPATIENT)
Dept: CARDIOLOGY CLINIC | Age: 48
End: 2018-05-09

## 2018-05-09 DIAGNOSIS — Z79.01 LONG TERM (CURRENT) USE OF ANTICOAGULANTS: Primary | ICD-10-CM

## 2018-05-09 DIAGNOSIS — I48.91 ATRIAL FIBRILLATION, UNSPECIFIED TYPE (HCC): ICD-10-CM

## 2018-05-09 LAB
INR BLD: 3.9
INR, EXTERNAL: 3.9 (ref 2–3)
PT POC: 46.5 SECONDS
VALID INTERNAL CONTROL?: YES

## 2018-06-05 ENCOUNTER — CLINICAL SUPPORT (OUTPATIENT)
Dept: CARDIOLOGY CLINIC | Age: 48
End: 2018-06-05

## 2018-06-05 DIAGNOSIS — I48.91 ATRIAL FIBRILLATION, UNSPECIFIED TYPE (HCC): ICD-10-CM

## 2018-06-05 DIAGNOSIS — Z79.01 LONG TERM (CURRENT) USE OF ANTICOAGULANTS: Primary | ICD-10-CM

## 2018-06-05 LAB
INR BLD: 1.3
INR, EXTERNAL: 1.3 (ref 2–3)
PT POC: 15.8 SECONDS
VALID INTERNAL CONTROL?: YES

## 2018-06-11 ENCOUNTER — CLINICAL SUPPORT (OUTPATIENT)
Dept: CARDIOLOGY CLINIC | Age: 48
End: 2018-06-11

## 2018-06-11 DIAGNOSIS — Z79.01 LONG TERM (CURRENT) USE OF ANTICOAGULANTS: Primary | ICD-10-CM

## 2018-06-11 DIAGNOSIS — I48.91 ATRIAL FIBRILLATION, UNSPECIFIED TYPE (HCC): ICD-10-CM

## 2018-06-11 LAB
INR BLD: 2.5
INR, EXTERNAL: 2.5 (ref 2–3)
PT POC: 30.3 SECONDS
VALID INTERNAL CONTROL?: YES

## 2018-06-27 ENCOUNTER — DOCUMENTATION ONLY (OUTPATIENT)
Dept: CARDIOLOGY CLINIC | Age: 48
End: 2018-06-27

## 2018-06-27 ENCOUNTER — OFFICE VISIT (OUTPATIENT)
Dept: CARDIOLOGY CLINIC | Age: 48
End: 2018-06-27

## 2018-06-27 ENCOUNTER — CLINICAL SUPPORT (OUTPATIENT)
Dept: CARDIOLOGY CLINIC | Age: 48
End: 2018-06-27

## 2018-06-27 VITALS
OXYGEN SATURATION: 98 % | HEART RATE: 98 BPM | BODY MASS INDEX: 25.31 KG/M2 | SYSTOLIC BLOOD PRESSURE: 110 MMHG | DIASTOLIC BLOOD PRESSURE: 80 MMHG | WEIGHT: 167 LBS | HEIGHT: 68 IN

## 2018-06-27 DIAGNOSIS — I48.91 ATRIAL FIBRILLATION, UNSPECIFIED TYPE (HCC): Primary | ICD-10-CM

## 2018-06-27 DIAGNOSIS — I48.91 ATRIAL FIBRILLATION, UNSPECIFIED TYPE (HCC): ICD-10-CM

## 2018-06-27 DIAGNOSIS — Z79.01 LONG TERM (CURRENT) USE OF ANTICOAGULANTS: Primary | ICD-10-CM

## 2018-06-27 LAB
INR BLD: 3.6
INR, EXTERNAL: 3.6 (ref 2–3)
PT POC: 42.9 SECONDS
VALID INTERNAL CONTROL?: YES

## 2018-06-27 NOTE — PROGRESS NOTES
Called patient. No answer. Unable to leave VM. Called per Dr. Diaz Patient regarding being unable to get in touch with his cardiologist in [place].

## 2018-06-27 NOTE — PROGRESS NOTES
Visit Vitals    /80 (BP 1 Location: Left arm, BP Patient Position: Sitting)    Pulse 98    Ht 5' 8\" (1.727 m)    Wt 167 lb (75.8 kg)    SpO2 98%    BMI 25.39 kg/m2

## 2018-06-28 NOTE — PROGRESS NOTES
Patient seen briefly. Obtained contact information for his cardiologist in [place]. Will reach out to him to discuss his case.        Janeann Aschoff, MD

## 2018-07-18 ENCOUNTER — CLINICAL SUPPORT (OUTPATIENT)
Dept: CARDIOLOGY CLINIC | Age: 48
End: 2018-07-18

## 2018-07-18 DIAGNOSIS — Z79.01 LONG TERM (CURRENT) USE OF ANTICOAGULANTS: Primary | ICD-10-CM

## 2018-07-18 DIAGNOSIS — I48.91 ATRIAL FIBRILLATION, UNSPECIFIED TYPE (HCC): ICD-10-CM

## 2018-07-18 LAB
INR BLD: 1.8
INR, EXTERNAL: 1.8 (ref 2–3)
PT POC: 21.6 SECONDS
VALID INTERNAL CONTROL?: YES

## 2018-08-07 ENCOUNTER — CLINICAL SUPPORT (OUTPATIENT)
Dept: CARDIOLOGY CLINIC | Age: 48
End: 2018-08-07

## 2018-08-07 DIAGNOSIS — Z79.01 LONG TERM (CURRENT) USE OF ANTICOAGULANTS: Primary | ICD-10-CM

## 2018-08-07 DIAGNOSIS — I48.91 ATRIAL FIBRILLATION, UNSPECIFIED TYPE (HCC): ICD-10-CM

## 2018-08-07 LAB
INR BLD: 3.2
INR, EXTERNAL: 3.2 (ref 2–3)
PT POC: 42 SECONDS
VALID INTERNAL CONTROL?: YES

## 2018-08-30 NOTE — PROGRESS NOTES
HISTORY OF PRESENTING ILLNESS      Isaac Aguilera is a 50 y.o. male with long-standing persistent AF, hypertension, cardiomyopathy with LVEF 20%, mild RV dysfunction, severe mitral regurgitation who was recently hospitalized for atrial fibrillation with rapid ventricular response for which a Cardizem drip was initially started. He was transitioned to oral therapy. His INR was supratherapeutic. His carvedilol dose was adjusted from 10 mg/5 mg to 10 mg twice daily. It was unclear whether the patient had undergone an ischemic workup in the past and a left heart catheterization was recommended however the patient refused. He denies chest pain, shortness of breath, lower extremity edema, syncope. ACTIVE PROBLEM LIST     Patient Active Problem List    Diagnosis Date Noted    [facility] ([facility] 07/31/2017           PAST MEDICAL HISTORY     Past Medical History:   Diagnosis Date    Arrhythmia     Hypertension            PAST SURGICAL HISTORY     No past surgical history on file. ALLERGIES     No Known Allergies       FAMILY HISTORY     No family history on file. negative for cardiac disease       SOCIAL HISTORY     Social History     Social History    Marital status:      Spouse name: N/A    Number of children: N/A    Years of education: N/A     Social History Main Topics    Smoking status: Never Smoker    Smokeless tobacco: Never Used    Alcohol use 12.6 oz/week     21 Cans of beer per week    Drug use: No    Sexual activity: No     Other Topics Concern    None     Social History Narrative         MEDICATIONS     Current Outpatient Prescriptions   Medication Sig    warfarin (COUMADIN) 7.5 mg tablet Take 3.75 mg by mouth daily. 1/2 tablet daily    enalapril (VASOTEC) 2.5 mg tablet Take 2.5 mg by mouth daily.  FEBUXOSTAT PO Take 50 mg by mouth daily (after breakfast).  Translated from medication list in [place] (medication name Urinorm)     CARVEDILOL PO Take 10 mg by mouth daily (after breakfast). Translated from medication list in [place] (medication name Artist)    CARVEDILOL PO Take 5 mg by mouth daily (after dinner). Translated from medication list in [place] (medication name Artist)     No current facility-administered medications for this visit. I have reviewed the nurses notes, vitals, problem list, allergy list, medical history, family, social history and medications. REVIEW OF SYMPTOMS      General: Pt denies excessive weight gain or loss. Pt is able to conduct ADL's  HEENT: Denies blurred vision, headaches, hearing loss, epistaxis and difficulty swallowing. Respiratory: Denies cough, congestion, shortness of breath, PEREA, wheezing or stridor. Cardiovascular: Denies precordial pain, palpitations, edema or PND  Gastrointestinal: Denies poor appetite, indigestion, abdominal pain or blood in stool  Genitourinary: Denies hematuria, dysuria, increased urinary frequency  Musculoskeletal: Denies joint pain or swelling from muscles or joints  Neurologic: Denies tremor, paresthesias, headache, or sensory motor disturbance  Psychiatric: Denies confusion, insomnia, depression  Integumentray: Denies rash, itching or ulcers. Hematologic: Denies easy bruising, bleeding       PHYSICAL EXAMINATION      Vitals:    11/29/17 0957   BP: 120/80   Pulse: 85   SpO2: 98%   Weight: 164 lb (74.4 kg)   Height: 5' 8\" (1.727 m)     General: Well developed, in no acute distress. HEENT: No jaundice, oral mucosa moist, no oral ulcers  Neck: Supple, no stiffness, no lymphadenopathy, supple  Heart:  Normal S1/S2 negative S3 or S4. Regular, no murmur, gallop or rub, no jugular venous distention  Respiratory: Clear bilaterally x 4, no wheezing or rales  Abdomen:   Soft, non-tender, bowel sounds are active.   Extremities:  No edema, normal cap refill, no cyanosis.   Musculoskeletal: No clubbing, no deformities  Neuro: A&Ox3, speech clear, gait stable, cooperative, no focal neurologic deficits  Skin: Skin color is normal. No rashes or lesions. Non diaphoretic, moist.  Vascular: 2+ pulses symmetric in all extremities       DIAGNOSTIC DATA      EKG:        LABORATORY DATA      Lab Results   Component Value Date/Time    WBC 7.3 08/02/2017 12:16 AM    HGB 13.9 08/02/2017 12:16 AM    HCT 41.3 08/02/2017 12:16 AM    PLATELET 230 44/44/2942 12:16 AM    MCV 99.3 (H) 08/02/2017 12:16 AM      Lab Results   Component Value Date/Time    Sodium 139 08/02/2017 12:16 AM    Potassium 3.5 08/02/2017 12:16 AM    Chloride 106 08/02/2017 12:16 AM    CO2 25 08/02/2017 12:16 AM    Anion gap 8 08/02/2017 12:16 AM    Glucose 93 08/02/2017 12:16 AM    BUN 10 08/02/2017 12:16 AM    Creatinine 0.99 08/02/2017 12:16 AM    BUN/Creatinine ratio 10 (L) 08/02/2017 12:16 AM    GFR est AA >60 08/02/2017 12:16 AM    GFR est non-AA >60 08/02/2017 12:16 AM    Calcium 8.5 08/02/2017 12:16 AM    Bilirubin, total 1.0 07/31/2017 04:54 PM    AST (SGOT) 57 (H) 07/31/2017 04:54 PM    Alk. phosphatase 62 07/31/2017 04:54 PM    Protein, total 6.5 07/31/2017 04:54 PM    Albumin 3.3 (L) 07/31/2017 04:54 PM    Globulin 3.2 07/31/2017 04:54 PM    A-G Ratio 1.0 (L) 07/31/2017 04:54 PM    ALT (SGPT) 121 (H) 07/31/2017 04:54 PM           ASSESSMENT      1. Atrial fibrillation                        A. Symptomatic                        B. CHADSVASC 3                                  C.  Long-standing persistent  2. Hypertension  3. Cardiomyopathy                        A. Ischemic versus nonischemic? 4. Mitral regurgitation                        A.  Severe     PLAN     Will reach out to his cardiologist in [place] to discuss case to determine further need for invasive testing/intervention.         FOLLOW-UP     3 months        Arminda Ty MD  Cardiac Electrophysiology / [address] [address], [address] [place] [address], [address], [address] [place] [address]    Hillary Guzman  [phone] / [phone] Fax   [phone] / [phone] Fax

## 2018-09-07 ENCOUNTER — CLINICAL SUPPORT (OUTPATIENT)
Dept: CARDIOLOGY CLINIC | Age: 48
End: 2018-09-07

## 2018-09-07 DIAGNOSIS — I48.91 ATRIAL FIBRILLATION, UNSPECIFIED TYPE (HCC): ICD-10-CM

## 2018-09-07 DIAGNOSIS — Z79.01 LONG TERM (CURRENT) USE OF ANTICOAGULANTS: Primary | ICD-10-CM

## 2018-09-07 LAB
INR BLD: 3
INR, EXTERNAL: 3 (ref 2–3)
PT POC: 39.9 SECONDS
VALID INTERNAL CONTROL?: YES

## 2018-10-03 ENCOUNTER — CLINICAL SUPPORT (OUTPATIENT)
Dept: CARDIOLOGY CLINIC | Age: 48
End: 2018-10-03

## 2018-10-03 DIAGNOSIS — I48.91 ATRIAL FIBRILLATION, UNSPECIFIED TYPE (HCC): ICD-10-CM

## 2018-10-03 DIAGNOSIS — Z79.01 LONG TERM (CURRENT) USE OF ANTICOAGULANTS: Primary | ICD-10-CM

## 2018-10-03 LAB
INR BLD: 6
INR, EXTERNAL: 6
PT POC: 71.8 SECONDS
VALID INTERNAL CONTROL?: YES

## 2018-10-05 ENCOUNTER — CLINICAL SUPPORT (OUTPATIENT)
Dept: CARDIOLOGY CLINIC | Age: 48
End: 2018-10-05

## 2018-10-05 DIAGNOSIS — Z79.01 LONG TERM (CURRENT) USE OF ANTICOAGULANTS: Primary | ICD-10-CM

## 2018-10-05 DIAGNOSIS — I48.91 ATRIAL FIBRILLATION, UNSPECIFIED TYPE (HCC): ICD-10-CM

## 2018-10-05 LAB
INR BLD: 1.9
INR, EXTERNAL: 1.9 (ref 2–3)
PT POC: 20.3 SECONDS
VALID INTERNAL CONTROL?: YES

## 2018-11-21 ENCOUNTER — CLINICAL SUPPORT (OUTPATIENT)
Dept: CARDIOLOGY CLINIC | Age: 48
End: 2018-11-21

## 2018-11-21 DIAGNOSIS — I48.91 ATRIAL FIBRILLATION, UNSPECIFIED TYPE (HCC): Primary | ICD-10-CM

## 2018-11-21 LAB
INR BLD: 3.2
INR, EXTERNAL: 3.1 (ref 2–3)
PT POC: 38.8 SECONDS
VALID INTERNAL CONTROL?: YES

## 2018-12-18 ENCOUNTER — CLINICAL SUPPORT (OUTPATIENT)
Dept: CARDIOLOGY CLINIC | Age: 48
End: 2018-12-18

## 2018-12-18 DIAGNOSIS — I48.91 ATRIAL FIBRILLATION, UNSPECIFIED TYPE (HCC): ICD-10-CM

## 2018-12-18 DIAGNOSIS — Z79.01 ANTICOAGULATED ON COUMADIN: Primary | ICD-10-CM

## 2018-12-18 LAB
INR BLD: 1.5
INR, EXTERNAL: 1.5 (ref 2–3)
PT POC: 18.5 SECONDS
VALID INTERNAL CONTROL?: YES

## 2018-12-28 ENCOUNTER — CLINICAL SUPPORT (OUTPATIENT)
Dept: CARDIOLOGY CLINIC | Age: 48
End: 2018-12-28

## 2018-12-28 DIAGNOSIS — I48.91 ATRIAL FIBRILLATION, UNSPECIFIED TYPE (HCC): Primary | ICD-10-CM

## 2018-12-28 LAB
INR BLD: 2.9
INR, EXTERNAL: 2.9 (ref 2–3)
PT POC: 34.4 SECONDS
VALID INTERNAL CONTROL?: YES

## 2019-01-25 ENCOUNTER — CLINICAL SUPPORT (OUTPATIENT)
Dept: CARDIOLOGY CLINIC | Age: 49
End: 2019-01-25

## 2019-01-25 DIAGNOSIS — I48.0 PAROXYSMAL ATRIAL FIBRILLATION (HCC): Primary | ICD-10-CM

## 2019-01-25 LAB
INR BLD: 4.4
INR, EXTERNAL: 4.4 (ref 2–3)
PT POC: 52.9 SECONDS
VALID INTERNAL CONTROL?: YES

## 2019-02-19 ENCOUNTER — ANTI-COAG VISIT (OUTPATIENT)
Dept: CARDIOLOGY CLINIC | Age: 49
End: 2019-02-19

## 2019-02-19 DIAGNOSIS — I48.91 ATRIAL FIBRILLATION, UNSPECIFIED TYPE (HCC): Primary | ICD-10-CM

## 2019-02-19 LAB
INR BLD: 3
INR, EXTERNAL: 3 (ref 2–3)
PT POC: 40.4 SECONDS
VALID INTERNAL CONTROL?: YES

## 2019-03-26 ENCOUNTER — ANTI-COAG VISIT (OUTPATIENT)
Dept: CARDIOLOGY CLINIC | Age: 49
End: 2019-03-26

## 2019-03-26 DIAGNOSIS — I48.91 ATRIAL FIBRILLATION, UNSPECIFIED TYPE (HCC): Primary | ICD-10-CM

## 2019-03-26 LAB
INR BLD: 2.6
INR, EXTERNAL: 2.6 (ref 2–3)
PT POC: 31.7 SECONDS
VALID INTERNAL CONTROL?: YES

## 2019-04-30 ENCOUNTER — ANTI-COAG VISIT (OUTPATIENT)
Dept: CARDIOLOGY CLINIC | Age: 49
End: 2019-04-30

## 2019-04-30 DIAGNOSIS — I48.91 ATRIAL FIBRILLATION, UNSPECIFIED TYPE (HCC): Primary | ICD-10-CM

## 2019-04-30 LAB
INR BLD: 2.9
INR, EXTERNAL: 2.9 (ref 2–3)
PT POC: 34.6 SECONDS
VALID INTERNAL CONTROL?: YES